# Patient Record
Sex: MALE | Race: WHITE | Employment: FULL TIME | URBAN - METROPOLITAN AREA
[De-identification: names, ages, dates, MRNs, and addresses within clinical notes are randomized per-mention and may not be internally consistent; named-entity substitution may affect disease eponyms.]

---

## 2018-12-06 ENCOUNTER — HOSPITAL ENCOUNTER (OUTPATIENT)
Age: 58
Setting detail: OBSERVATION
Discharge: HOME | End: 2018-12-07
Attending: EMERGENCY MEDICINE | Admitting: INTERNAL MEDICINE
Payer: COMMERCIAL

## 2018-12-06 ENCOUNTER — APPOINTMENT (OUTPATIENT)
Dept: GENERAL RADIOLOGY | Age: 58
End: 2018-12-06
Payer: COMMERCIAL

## 2018-12-06 DIAGNOSIS — R07.9 CHEST PAIN, UNSPECIFIED TYPE: Primary | ICD-10-CM

## 2018-12-06 LAB
A/G RATIO: 1.5 (ref 1.1–2.2)
ALBUMIN SERPL-MCNC: 4.4 G/DL (ref 3.4–5)
ALP BLD-CCNC: 117 U/L (ref 40–129)
ALT SERPL-CCNC: 31 U/L (ref 10–40)
ANION GAP SERPL CALCULATED.3IONS-SCNC: 11 MMOL/L (ref 3–16)
AST SERPL-CCNC: 19 U/L (ref 15–37)
BASOPHILS ABSOLUTE: 0 K/UL (ref 0–0.2)
BASOPHILS RELATIVE PERCENT: 0.5 %
BILIRUB SERPL-MCNC: 0.4 MG/DL (ref 0–1)
BUN BLDV-MCNC: 11 MG/DL (ref 7–20)
CALCIUM SERPL-MCNC: 9 MG/DL (ref 8.3–10.6)
CHLORIDE BLD-SCNC: 97 MMOL/L (ref 99–110)
CO2: 29 MMOL/L (ref 21–32)
CREAT SERPL-MCNC: 0.9 MG/DL (ref 0.9–1.3)
EKG ATRIAL RATE: 66 BPM
EKG DIAGNOSIS: NORMAL
EKG P AXIS: 31 DEGREES
EKG P-R INTERVAL: 134 MS
EKG Q-T INTERVAL: 386 MS
EKG QRS DURATION: 96 MS
EKG QTC CALCULATION (BAZETT): 404 MS
EKG R AXIS: 48 DEGREES
EKG T AXIS: 24 DEGREES
EKG VENTRICULAR RATE: 66 BPM
EOSINOPHILS ABSOLUTE: 0.1 K/UL (ref 0–0.6)
EOSINOPHILS RELATIVE PERCENT: 1.1 %
GFR AFRICAN AMERICAN: >60
GFR NON-AFRICAN AMERICAN: >60
GLOBULIN: 2.9 G/DL
GLUCOSE BLD-MCNC: 185 MG/DL (ref 70–99)
GLUCOSE BLD-MCNC: 225 MG/DL (ref 70–99)
HCT VFR BLD CALC: 49.3 % (ref 40.5–52.5)
HEMOGLOBIN: 17.2 G/DL (ref 13.5–17.5)
LYMPHOCYTES ABSOLUTE: 1.8 K/UL (ref 1–5.1)
LYMPHOCYTES RELATIVE PERCENT: 25.5 %
MCH RBC QN AUTO: 32.1 PG (ref 26–34)
MCHC RBC AUTO-ENTMCNC: 34.9 G/DL (ref 31–36)
MCV RBC AUTO: 92.1 FL (ref 80–100)
MONOCYTES ABSOLUTE: 0.5 K/UL (ref 0–1.3)
MONOCYTES RELATIVE PERCENT: 7.7 %
NEUTROPHILS ABSOLUTE: 4.5 K/UL (ref 1.7–7.7)
NEUTROPHILS RELATIVE PERCENT: 65.2 %
PDW BLD-RTO: 12.6 % (ref 12.4–15.4)
PERFORMED ON: ABNORMAL
PLATELET # BLD: 219 K/UL (ref 135–450)
PMV BLD AUTO: 7.3 FL (ref 5–10.5)
POTASSIUM SERPL-SCNC: 4.2 MMOL/L (ref 3.5–5.1)
RBC # BLD: 5.35 M/UL (ref 4.2–5.9)
SODIUM BLD-SCNC: 137 MMOL/L (ref 136–145)
TOTAL PROTEIN: 7.3 G/DL (ref 6.4–8.2)
TROPONIN: <0.01 NG/ML
WBC # BLD: 6.9 K/UL (ref 4–11)

## 2018-12-06 PROCEDURE — 80053 COMPREHEN METABOLIC PANEL: CPT

## 2018-12-06 PROCEDURE — G0378 HOSPITAL OBSERVATION PER HR: HCPCS

## 2018-12-06 PROCEDURE — 93005 ELECTROCARDIOGRAM TRACING: CPT | Performed by: EMERGENCY MEDICINE

## 2018-12-06 PROCEDURE — 93010 ELECTROCARDIOGRAM REPORT: CPT | Performed by: INTERNAL MEDICINE

## 2018-12-06 PROCEDURE — 36415 COLL VENOUS BLD VENIPUNCTURE: CPT

## 2018-12-06 PROCEDURE — 84484 ASSAY OF TROPONIN QUANT: CPT

## 2018-12-06 PROCEDURE — 2580000003 HC RX 258: Performed by: INTERNAL MEDICINE

## 2018-12-06 PROCEDURE — 99285 EMERGENCY DEPT VISIT HI MDM: CPT

## 2018-12-06 PROCEDURE — 71046 X-RAY EXAM CHEST 2 VIEWS: CPT

## 2018-12-06 PROCEDURE — 85025 COMPLETE CBC W/AUTO DIFF WBC: CPT

## 2018-12-06 PROCEDURE — 83036 HEMOGLOBIN GLYCOSYLATED A1C: CPT

## 2018-12-06 PROCEDURE — 6370000000 HC RX 637 (ALT 250 FOR IP): Performed by: PHYSICIAN ASSISTANT

## 2018-12-06 RX ORDER — DIAZEPAM 2 MG/1
2 TABLET ORAL 3 TIMES DAILY
Status: DISCONTINUED | OUTPATIENT
Start: 2018-12-06 | End: 2018-12-07 | Stop reason: HOSPADM

## 2018-12-06 RX ORDER — SODIUM CHLORIDE 0.9 % (FLUSH) 0.9 %
10 SYRINGE (ML) INJECTION EVERY 12 HOURS SCHEDULED
Status: DISCONTINUED | OUTPATIENT
Start: 2018-12-06 | End: 2018-12-07 | Stop reason: HOSPADM

## 2018-12-06 RX ORDER — ATORVASTATIN CALCIUM 40 MG/1
40 TABLET, FILM COATED ORAL NIGHTLY
Status: DISCONTINUED | OUTPATIENT
Start: 2018-12-06 | End: 2018-12-07 | Stop reason: HOSPADM

## 2018-12-06 RX ORDER — ASPIRIN 81 MG/1
324 TABLET, CHEWABLE ORAL ONCE
Status: COMPLETED | OUTPATIENT
Start: 2018-12-06 | End: 2018-12-06

## 2018-12-06 RX ORDER — ONDANSETRON 2 MG/ML
4 INJECTION INTRAMUSCULAR; INTRAVENOUS EVERY 6 HOURS PRN
Status: DISCONTINUED | OUTPATIENT
Start: 2018-12-06 | End: 2018-12-07 | Stop reason: HOSPADM

## 2018-12-06 RX ORDER — DEXTROSE MONOHYDRATE 25 G/50ML
12.5 INJECTION, SOLUTION INTRAVENOUS PRN
Status: DISCONTINUED | OUTPATIENT
Start: 2018-12-06 | End: 2018-12-07 | Stop reason: HOSPADM

## 2018-12-06 RX ORDER — NITROGLYCERIN 0.4 MG/1
0.4 TABLET SUBLINGUAL EVERY 5 MIN PRN
Status: DISCONTINUED | OUTPATIENT
Start: 2018-12-06 | End: 2018-12-07 | Stop reason: HOSPADM

## 2018-12-06 RX ORDER — SODIUM CHLORIDE 0.9 % (FLUSH) 0.9 %
10 SYRINGE (ML) INJECTION PRN
Status: DISCONTINUED | OUTPATIENT
Start: 2018-12-06 | End: 2018-12-07 | Stop reason: HOSPADM

## 2018-12-06 RX ORDER — ASPIRIN 81 MG/1
81 TABLET, CHEWABLE ORAL DAILY
Status: DISCONTINUED | OUTPATIENT
Start: 2018-12-07 | End: 2018-12-07 | Stop reason: HOSPADM

## 2018-12-06 RX ORDER — FAMOTIDINE 20 MG/1
20 TABLET, FILM COATED ORAL 2 TIMES DAILY
Status: DISCONTINUED | OUTPATIENT
Start: 2018-12-06 | End: 2018-12-07 | Stop reason: HOSPADM

## 2018-12-06 RX ORDER — DEXTROSE MONOHYDRATE 50 MG/ML
100 INJECTION, SOLUTION INTRAVENOUS PRN
Status: DISCONTINUED | OUTPATIENT
Start: 2018-12-06 | End: 2018-12-07 | Stop reason: HOSPADM

## 2018-12-06 RX ORDER — NICOTINE POLACRILEX 4 MG
15 LOZENGE BUCCAL PRN
Status: DISCONTINUED | OUTPATIENT
Start: 2018-12-06 | End: 2018-12-07 | Stop reason: HOSPADM

## 2018-12-06 RX ADMIN — NITROGLYCERIN 0.5 INCH: 20 OINTMENT TOPICAL at 14:15

## 2018-12-06 RX ADMIN — ASPIRIN 81 MG 162 MG: 81 TABLET ORAL at 14:14

## 2018-12-06 RX ADMIN — SODIUM CHLORIDE, PRESERVATIVE FREE 10 ML: 5 INJECTION INTRAVENOUS at 20:37

## 2018-12-06 ASSESSMENT — PAIN DESCRIPTION - PAIN TYPE: TYPE: ACUTE PAIN

## 2018-12-06 ASSESSMENT — PAIN SCALES - GENERAL
PAINLEVEL_OUTOF10: 0
PAINLEVEL_OUTOF10: 0
PAINLEVEL_OUTOF10: 4

## 2018-12-06 ASSESSMENT — PAIN DESCRIPTION - LOCATION: LOCATION: CHEST

## 2018-12-06 ASSESSMENT — PAIN DESCRIPTION - ORIENTATION: ORIENTATION: LEFT

## 2018-12-06 NOTE — ED PROVIDER NOTES
atherosclerosis. The cardiomediastinal and hilar silhouettes appear otherwise unremarkable. The lungs appear clear. No pleural fluid evident. No pneumothorax is seen. No acute osseous abnormality is identified. No acute pulmonary disease. Calcific atherosclerotic disease aorta. HEART SCORE:  History: +1 for moderate suspicion  EKG: +0 for normal EKG   Age: +1 for age 44-72 years  Risk factors (includes HLD, HTN, DM, tobacco use, obesity, and +FHx): +2 for known CAD or 3+ risk factors  Initial troponin: +0 for negative troponin    Heart score: 4. This falls under the following category: Score of 4-6, which indicates low/moderate risk for major adverse cardiac event and supports observation with repeated troponins and/or non-invasive testing    ED COURSE   I have evaluated this patient in collaboration with Dr. Billy Leon. Patient received aspirin and Nitropaste for symptoms although currently asymptomatic, with good relief. I'll triage vitals with elevated blood pressure 160/117. Remainder vital stable. CBC and CMP unremarkable. Troponin less than 0.01. Chest x-ray clear. EKG normal sinus rhythm without acute evidence for acute ischemia. Given the social and family history as well as elevated blood pressure and acute elevated blood glucose feel that patient may have undiagnosed diabetes as well as hypertension and increasing clinical concern for potential cardiac etiology. we are at this time recommending admission. I spoke with hospitalist Dr. Maik Medrano and orders have been placed. A discussion was had with Mr. Valerie Jaramillo regarding chest pain, ED findings, and recommendations for admission. All questions were answered. Patient is in agreement.     MDM  Results for orders placed or performed during the hospital encounter of 12/06/18   CBC Auto Differential   Result Value Ref Range    WBC 6.9 4.0 - 11.0 K/uL    RBC 5.35 4.20 - 5.90 M/uL    Hemoglobin 17.2 13.5 - 17.5 g/dL    Hematocrit 49.3 40.5 - 52.5 % Blood pressure (!) 159/98, pulse 60, temperature 98.6 °F (37 °C), temperature source Oral, resp. rate 18, height 5' 8\" (1.727 m), weight 190 lb (86.2 kg), SpO2 100 %. DISPOSITION  Patient was admitted to the hospital in stable condition.           Abdifatah Gould, [address]  12/06/18 Sanjuana Barry

## 2018-12-06 NOTE — ED PROVIDER NOTES
I independently performed a history and physical on [employer]. All diagnostic, treatment, and disposition decisions were made by myself in conjunction with the advanced practice provider. For further details of [facility] emergency department encounter, please see ANUJ Mosqueda's documentation. Patient presents today was apparently complaining of chest pain. Patient has had increasing frequency of chest pain over the last several days. Pain radiates into the left shoulder. No fevers, chills, sweats. No external swelling or edema. Physical exam: Gen.: No distress. Heart: Regular rhythm. Normal S1-S2. Lungs: Clear to auscultation bilaterally. Wheeze, rales or rhonchi. Heart: Soft. External: No swelling or edema. Undiagnosed DM and hypertension.      1. Chest pain, unspecified type           August Orr, DO  12/06/18 [address] [place] [address], DO  12/06/18 [id number]

## 2018-12-06 NOTE — H&P
Hospital Medicine History & Physical      PCP: No primary care provider on file. Date of Admission: 12/6/2018    Date of Service: Pt seen/examined on 6 Dec and  Placed in Observation. Chief Complaint:  Chest pain       History Of Present Illness:        60 y.o. male w/out prior hx on no home meds but w/ significant family hx of CAD who presented to [facility] with acute onset moderately severe non-exertional substernal CP w/ radiation to LUE but w/out associated typical cardiac features. The patient denies any fever/chills or other signs/sxs of systemic illness or identifiable aggravating/alleviating factors. Family reports significant Etoh use. Past Medical History:      History reviewed. No pertinent past medical history. Past Surgical History:          Procedure Laterality Date    LEG SURGERY Left     kahlil in leg       Medications Prior to Admission:      Prior to Admission medications    Not on File       Allergies:  Patient has no known allergies. Social History:      The patient currently lives independently    TOBACCO:   reports that he has been smoking Cigarettes. He has been smoking about 1.00 pack per day. His smokeless tobacco use includes Chew. ETOH:   reports that he drinks alcohol. Family History:      Reviewed in detail and negative for DM, CAD, Cancer, CVA. Positive as follows:    History reviewed. No pertinent family history. REVIEW OF SYSTEMS:   Pertinent positives as noted in the HPI. All other systems reviewed and negative. PHYSICAL EXAM:    BP (!) 162/93   Pulse 60   Temp 98.4 °F (36.9 °C) (Oral)   Resp 16   Ht 5' 8\" (1.727 m)   Wt 190 lb (86.2 kg)   SpO2 98%   BMI 28.89 kg/m²     General appearance:  No apparent distress, appears stated age and cooperative. HEENT:  Normal cephalic, atraumatic without obvious deformity. Pupils equal, round, and reactive to light. Extra ocular muscles intact. Conjunctivae/corneas clear.   Neck: Supple, with

## 2018-12-07 ENCOUNTER — APPOINTMENT (OUTPATIENT)
Dept: NUCLEAR MEDICINE | Age: 58
End: 2018-12-07
Payer: COMMERCIAL

## 2018-12-07 VITALS
SYSTOLIC BLOOD PRESSURE: 149 MMHG | WEIGHT: 180.8 LBS | RESPIRATION RATE: 16 BRPM | TEMPERATURE: 98.3 F | BODY MASS INDEX: 27.4 KG/M2 | HEART RATE: 59 BPM | HEIGHT: 68 IN | OXYGEN SATURATION: 96 % | DIASTOLIC BLOOD PRESSURE: 80 MMHG

## 2018-12-07 LAB
ALBUMIN SERPL-MCNC: 3.8 G/DL (ref 3.4–5)
ANION GAP SERPL CALCULATED.3IONS-SCNC: 9 MMOL/L (ref 3–16)
BUN BLDV-MCNC: 13 MG/DL (ref 7–20)
CALCIUM SERPL-MCNC: 8.7 MG/DL (ref 8.3–10.6)
CHLORIDE BLD-SCNC: 100 MMOL/L (ref 99–110)
CHOLESTEROL, TOTAL: 182 MG/DL (ref 0–199)
CO2: 29 MMOL/L (ref 21–32)
CREAT SERPL-MCNC: 0.9 MG/DL (ref 0.9–1.3)
EKG ATRIAL RATE: 57 BPM
EKG DIAGNOSIS: NORMAL
EKG P AXIS: 30 DEGREES
EKG P-R INTERVAL: 138 MS
EKG Q-T INTERVAL: 420 MS
EKG QRS DURATION: 90 MS
EKG QTC CALCULATION (BAZETT): 408 MS
EKG R AXIS: 57 DEGREES
EKG T AXIS: 52 DEGREES
EKG VENTRICULAR RATE: 57 BPM
ESTIMATED AVERAGE GLUCOSE: 119.8 MG/DL
GFR AFRICAN AMERICAN: >60
GFR NON-AFRICAN AMERICAN: >60
GLUCOSE BLD-MCNC: 117 MG/DL (ref 70–99)
GLUCOSE BLD-MCNC: 122 MG/DL (ref 70–99)
GLUCOSE BLD-MCNC: 138 MG/DL (ref 70–99)
HBA1C MFR BLD: 5.8 %
HCT VFR BLD CALC: 43.7 % (ref 40.5–52.5)
HDLC SERPL-MCNC: 29 MG/DL (ref 40–60)
HEMOGLOBIN: 15.5 G/DL (ref 13.5–17.5)
LDL CHOLESTEROL CALCULATED: 99 MG/DL
LV EF: 60 %
LVEF MODALITY: NORMAL
MCH RBC QN AUTO: 32.3 PG (ref 26–34)
MCHC RBC AUTO-ENTMCNC: 35.4 G/DL (ref 31–36)
MCV RBC AUTO: 91.3 FL (ref 80–100)
PDW BLD-RTO: 12.3 % (ref 12.4–15.4)
PERFORMED ON: ABNORMAL
PERFORMED ON: ABNORMAL
PHOSPHORUS: 3.8 MG/DL (ref 2.5–4.9)
PLATELET # BLD: 193 K/UL (ref 135–450)
PMV BLD AUTO: 7.3 FL (ref 5–10.5)
POTASSIUM SERPL-SCNC: 4.2 MMOL/L (ref 3.5–5.1)
RBC # BLD: 4.78 M/UL (ref 4.2–5.9)
SODIUM BLD-SCNC: 138 MMOL/L (ref 136–145)
TRIGL SERPL-MCNC: 270 MG/DL (ref 0–150)
VLDLC SERPL CALC-MCNC: 54 MG/DL
WBC # BLD: 7.2 K/UL (ref 4–11)

## 2018-12-07 PROCEDURE — 78452 HT MUSCLE IMAGE SPECT MULT: CPT

## 2018-12-07 PROCEDURE — 93017 CV STRESS TEST TRACING ONLY: CPT

## 2018-12-07 PROCEDURE — 6370000000 HC RX 637 (ALT 250 FOR IP): Performed by: INTERNAL MEDICINE

## 2018-12-07 PROCEDURE — G0378 HOSPITAL OBSERVATION PER HR: HCPCS

## 2018-12-07 PROCEDURE — A9502 TC99M TETROFOSMIN: HCPCS | Performed by: INTERNAL MEDICINE

## 2018-12-07 PROCEDURE — 36415 COLL VENOUS BLD VENIPUNCTURE: CPT

## 2018-12-07 PROCEDURE — 85027 COMPLETE CBC AUTOMATED: CPT

## 2018-12-07 PROCEDURE — 93010 ELECTROCARDIOGRAM REPORT: CPT | Performed by: INTERNAL MEDICINE

## 2018-12-07 PROCEDURE — 93005 ELECTROCARDIOGRAM TRACING: CPT | Performed by: INTERNAL MEDICINE

## 2018-12-07 PROCEDURE — 80061 LIPID PANEL: CPT

## 2018-12-07 PROCEDURE — 80069 RENAL FUNCTION PANEL: CPT

## 2018-12-07 PROCEDURE — 3430000000 HC RX DIAGNOSTIC RADIOPHARMACEUTICAL: Performed by: INTERNAL MEDICINE

## 2018-12-07 PROCEDURE — 6360000002 HC RX W HCPCS: Performed by: INTERNAL MEDICINE

## 2018-12-07 RX ORDER — ASPIRIN 81 MG/1
81 TABLET, CHEWABLE ORAL DAILY
Qty: 30 TABLET | Refills: 0 | Status: SHIPPED | OUTPATIENT
Start: 2018-12-08 | End: 2022-08-11

## 2018-12-07 RX ORDER — SIMVASTATIN 20 MG
20 TABLET ORAL EVERY EVENING
Qty: 30 TABLET | Refills: 0 | Status: SHIPPED | OUTPATIENT
Start: 2018-12-07 | End: 2018-12-17 | Stop reason: SDUPTHER

## 2018-12-07 RX ORDER — AMLODIPINE BESYLATE 5 MG/1
5 TABLET ORAL DAILY
Qty: 30 TABLET | Refills: 0 | Status: SHIPPED | OUTPATIENT
Start: 2018-12-07 | End: 2018-12-17 | Stop reason: SDUPTHER

## 2018-12-07 RX ORDER — ATORVASTATIN CALCIUM 40 MG/1
40 TABLET, FILM COATED ORAL NIGHTLY
Qty: 30 TABLET | Refills: 0 | Status: SHIPPED | OUTPATIENT
Start: 2018-12-07 | End: 2018-12-07 | Stop reason: HOSPADM

## 2018-12-07 RX ADMIN — TETROFOSMIN 28 MILLICURIE: 0.23 INJECTION, POWDER, LYOPHILIZED, FOR SOLUTION INTRAVENOUS at 09:25

## 2018-12-07 RX ADMIN — DIAZEPAM 2 MG: 2 TABLET ORAL at 00:29

## 2018-12-07 RX ADMIN — REGADENOSON 0.4 MG: 0.08 INJECTION, SOLUTION INTRAVENOUS at 09:25

## 2018-12-07 RX ADMIN — TETROFOSMIN 11.9 MILLICURIE: 0.23 INJECTION, POWDER, LYOPHILIZED, FOR SOLUTION INTRAVENOUS at 08:15

## 2018-12-07 NOTE — PROGRESS NOTES
Hospitalist Progress Note      PCP: No primary care provider on file. Date of Admission: 12/6/2018    Chief Complaint: cp     Hospital Course:     Subjective:    Feels well today, no chest pain, sob. No new complaints. Stress results pending       Medications:  Reviewed    Infusion Medications    dextrose       Scheduled Medications    sodium chloride flush  10 mL Intravenous 2 times per day    famotidine  20 mg Oral BID    atorvastatin  40 mg Oral Nightly    aspirin  81 mg Oral Daily    enoxaparin  40 mg Subcutaneous Daily    insulin lispro  0-6 Units Subcutaneous TID WC    insulin lispro  0-3 Units Subcutaneous Nightly    diazepam  2 mg Oral TID    sodium chloride flush  10 mL Intravenous 2 times per day     PRN Meds: sodium chloride flush, magnesium hydroxide, ondansetron, nitroGLYCERIN, glucose, dextrose, glucagon (rDNA), dextrose, sodium chloride flush      Intake/Output Summary (Last 24 hours) at 12/07/18 1303  Last data filed at 12/07/18 0600   Gross per 24 hour   Intake                0 ml   Output                0 ml   Net                0 ml       Physical Exam Performed:    BP (!) 149/80   Pulse 59   Temp 98.3 °F (36.8 °C) (Oral)   Resp 16   Ht 5' 8\" (1.727 m)   Wt 180 lb 12.8 oz (82 kg)   SpO2 96%   BMI 27.49 kg/m²     General appearance: No apparent distress, appears stated age and cooperative. HEENT: Pupils equal, round, and reactive to light. Conjunctivae/corneas clear. Neck: Supple, with full range of motion. No jugular venous distention. Respiratory:  Normal respiratory effort. Clear to auscultation, bilaterally without Rales/Wheezes/Rhonchi. Cardiovascular: Regular rate and rhythm with normal S1/S2 without murmurs, rubs or gallops. Abdomen: Soft, non-tender, non-distended with normal bowel sounds. Musculoskeletal: No clubbing, cyanosis or edema bilaterally. Skin: Skin color, texture, turgor normal.  No rashes or lesions.   Neurologic:  Neurovascularly intact without any focal sensory/motor deficits. Cranial nerves: II-XII intact, grossly non-focal.  Psychiatric: Alert and oriented, thought content appropriate, normal insight  Capillary Refill: Brisk,< 3 seconds   Peripheral Pulses: +2 palpable, equal bilaterally       Labs:   Recent Labs      12/06/18   1355  12/07/18   0717   WBC  6.9  7.2   HGB  17.2  15.5   HCT  49.3  43.7   PLT  219  193     Recent Labs      12/06/18   1355  12/07/18   0717   NA  137  138   K  4.2  4.2   CL  97*  100   CO2  29  29   BUN  11  13   CREATININE  0.9  0.9   CALCIUM  9.0  8.7   PHOS   --   3.8     Recent Labs      12/06/18   1355   AST  19   ALT  31   BILITOT  0.4   ALKPHOS  117     No results for input(s): INR in the last 72 hours. Recent Labs      12/06/18   1355  12/06/18   1912  12/06/18   2219   TROPONINI  <0.01  <0.01  <0.01       Urinalysis:    No results found for: Charolotte Cleopatra, BACTERIA, RBCUA, BLOODU, SPECGRAV, GLUCOSEU    Radiology:  XR CHEST STANDARD (2 VW)   Final Result   No acute pulmonary disease. Calcific atherosclerotic disease aorta. NM Cardiac Stress Test Nuclear Imaging    (Results Pending)           Assessment/Plan:    Active Hospital Problems    Diagnosis Date Noted    Chest pain [R07.9] 12/06/2018       Chest pain - concerning for ACS in the ED. EKG wnl, troponin negative. Stress test pending. Possible cervical disease, possibly related to underlying HTN   - follow up stress  - ASA, statin  - management of 2ndary risk factors     Hyperglycemia - a1c pending. Suspect has underlying DM2 that will require medication management and education. Needs a PCP     HTN - uncontrolled, possibly some anxiety component while in the hospital however likely has uncontrolled essential HTN  - likely start amlodipine on d/c.      Tobacco abuse - counseled on cessation, nicotine patch PRN while inpatient offered        DVT Prophylaxis: lovenox   Diet: Diet NPO, After Midnight  Code Status: Full Code    PT/OT Eval Status: not

## 2018-12-07 NOTE — DISCHARGE SUMMARY
Hospital Medicine Discharge Summary    Patient ID: Axel Roman      Patient's PCP: No primary care provider on file. Admit Date: 12/6/2018     Discharge Date:   12/7/18    Admitting Physician: Hyun Langston MD     Discharge Physician: Enrique Saldaña MD     Discharge Diagnoses: Active Hospital Problems    Diagnosis Date Noted    Chest pain [R07.9] 12/06/2018       The patient was seen and examined on day of discharge and this discharge summary is in conjunction with any daily progress note from day of discharge. Hospital Course:     Chest pain - concerning for ACS on admission. Negative EKG, troponin negative. Stress test negative for reversible ischemia. Counseled on primary prevention issues as below. Needs to establish with PCP for age appropriate interventions. HTN - uncontrolled, started on amlodipine. Given information to establish with PCP and [facility]     HLD - started on statin     Hyperglycemia - suspect isolated 2/2 stress. A1c < 6. Continue with dietary changes     ETOH use - counseled on cessation     Tobacco abuse - counseled on cessation     Physical Exam Performed:     BP (!) 149/80   Pulse 59   Temp 98.3 °F (36.8 °C) (Oral)   Resp 16   Ht 5' 8\" (1.727 m)   Wt 180 lb 12.8 oz (82 kg)   SpO2 96%   BMI 27.49 kg/m²       General appearance:  No apparent distress, appears stated age and cooperative. HEENT:  Normal cephalic, atraumatic without obvious deformity. Pupils equal, round, and reactive to light. Neck: Supple, with full range of motion. No jugular venous distention. Trachea midline. Respiratory:  Normal respiratory effort. Clear to auscultation, bilaterally without Rales/Wheezes/Rhonchi. Cardiovascular:  Regular rate and rhythm with normal S1/S2 without murmurs, rubs or gallops. Abdomen: Soft, non-tender, non-distended with normal bowel sounds. Musculoskeletal:  No clubbing, cyanosis or edema bilaterally.   Full range of motion without

## 2018-12-16 PROBLEM — I10 ESSENTIAL HYPERTENSION: Status: ACTIVE | Noted: 2018-12-16

## 2018-12-16 PROBLEM — Z00.00 HEALTHCARE MAINTENANCE: Status: ACTIVE | Noted: 2018-12-16

## 2018-12-16 PROBLEM — R73.03 PREDIABETES: Status: ACTIVE | Noted: 2018-12-16

## 2018-12-16 ASSESSMENT — ENCOUNTER SYMPTOMS
SORE THROAT: 0
ABDOMINAL PAIN: 0
NAUSEA: 0
SHORTNESS OF BREATH: 0
BLOOD IN STOOL: 0
TROUBLE SWALLOWING: 0
DIARRHEA: 0
COUGH: 0
CONSTIPATION: 0
COLOR CHANGE: 0
CHEST TIGHTNESS: 0
RHINORRHEA: 0
VOMITING: 0

## 2018-12-17 ENCOUNTER — OFFICE VISIT (OUTPATIENT)
Dept: FAMILY MEDICINE CLINIC | Age: 58
End: 2018-12-17
Payer: COMMERCIAL

## 2018-12-17 VITALS
BODY MASS INDEX: 28.34 KG/M2 | WEIGHT: 186.4 LBS | OXYGEN SATURATION: 96 % | HEART RATE: 58 BPM | SYSTOLIC BLOOD PRESSURE: 150 MMHG | DIASTOLIC BLOOD PRESSURE: 90 MMHG

## 2018-12-17 DIAGNOSIS — E78.2 MIXED HYPERLIPIDEMIA: ICD-10-CM

## 2018-12-17 DIAGNOSIS — Z23 NEED FOR INFLUENZA VACCINATION: ICD-10-CM

## 2018-12-17 DIAGNOSIS — R73.03 PREDIABETES: ICD-10-CM

## 2018-12-17 DIAGNOSIS — R07.89 OTHER CHEST PAIN: ICD-10-CM

## 2018-12-17 DIAGNOSIS — I10 ESSENTIAL HYPERTENSION: ICD-10-CM

## 2018-12-17 DIAGNOSIS — Z76.89 ENCOUNTER TO ESTABLISH CARE: Primary | ICD-10-CM

## 2018-12-17 DIAGNOSIS — Z00.00 HEALTHCARE MAINTENANCE: ICD-10-CM

## 2018-12-17 DIAGNOSIS — Z72.0 TOBACCO USE: ICD-10-CM

## 2018-12-17 PROCEDURE — 99386 PREV VISIT NEW AGE 40-64: CPT | Performed by: FAMILY MEDICINE

## 2018-12-17 PROCEDURE — 99203 OFFICE O/P NEW LOW 30 MIN: CPT | Performed by: FAMILY MEDICINE

## 2018-12-17 PROCEDURE — 90715 TDAP VACCINE 7 YRS/> IM: CPT | Performed by: FAMILY MEDICINE

## 2018-12-17 PROCEDURE — 90732 PPSV23 VACC 2 YRS+ SUBQ/IM: CPT | Performed by: FAMILY MEDICINE

## 2018-12-17 PROCEDURE — 90472 IMMUNIZATION ADMIN EACH ADD: CPT | Performed by: FAMILY MEDICINE

## 2018-12-17 PROCEDURE — 90686 IIV4 VACC NO PRSV 0.5 ML IM: CPT | Performed by: FAMILY MEDICINE

## 2018-12-17 PROCEDURE — 90471 IMMUNIZATION ADMIN: CPT | Performed by: FAMILY MEDICINE

## 2018-12-17 RX ORDER — SIMVASTATIN 20 MG
20 TABLET ORAL EVERY EVENING
Qty: 90 TABLET | Refills: 1 | Status: SHIPPED | OUTPATIENT
Start: 2018-12-17 | End: 2019-07-21 | Stop reason: SDUPTHER

## 2018-12-17 RX ORDER — BUPROPION HYDROCHLORIDE 300 MG/1
300 TABLET ORAL EVERY MORNING
Qty: 30 TABLET | Refills: 1 | Status: SHIPPED | OUTPATIENT
Start: 2018-12-17 | End: 2019-01-21 | Stop reason: SDUPTHER

## 2018-12-17 RX ORDER — BUPROPION HYDROCHLORIDE 150 MG/1
150 TABLET ORAL EVERY MORNING
Qty: 7 TABLET | Refills: 0 | Status: SHIPPED | OUTPATIENT
Start: 2018-12-17 | End: 2019-01-21 | Stop reason: ALTCHOICE

## 2018-12-17 RX ORDER — AMLODIPINE BESYLATE 10 MG/1
5 TABLET ORAL DAILY
Qty: 90 TABLET | Refills: 3 | Status: SHIPPED | OUTPATIENT
Start: 2018-12-17 | End: 2018-12-18 | Stop reason: SDUPTHER

## 2018-12-17 NOTE — PROGRESS NOTES
Sobia Mart  YOB: 1960    Date of Service:  12/17/2018    Chief Complaint:   Sobia Mart is a 60 y.o. male who presents for complete physical examination. HPI:    Works nights,  at [employer]     Smoking: wants to quit     HTN: doesn't monitor at home  No CP, new Sxs today    Self-testicular exams: Yes, no concerns  Sexual activity: has sex with female, wife    Abnormal Sxs: none    Previous Colonoscopy no  BRBR, unexplained WL, bloating, abd pain   Yes, BRBR from hemorrhoids, last bleeding couple days ago  Family Hx of Colon Ca  yes - brother and mother, 47 for both     Dental Exams: \"It's been a while. \" H/o root canal     Exercise: walks 5 time(s) per week  Diet: eats a lot of fast food    Seatbelt use: Yes  Guns in the home: no  Sunscreen: No    Wt Readings from Last 3 Encounters:   12/17/18 186 lb 6.4 oz (84.6 kg)   12/07/18 180 lb 12.8 oz (82 kg)     BP Readings from Last 3 Encounters:   12/17/18 (!) 150/90   12/07/18 (!) 149/80       Patient Active Problem List   Diagnosis    Chest pain    Healthcare maintenance    Prediabetes    Essential hypertension       Health Maintenance   Topic Date Due    Hepatitis C screen  1960    HIV screen  11/16/1974    DTaP/Tdap/Td vaccine (1 - Tdap) 11/16/1978    Pneumococcal med risk (1 of 1 - PPSV23) 11/16/1978    Shingles Vaccine (1 of 2 - 2 Dose Series) 11/16/2009    Colon cancer screen colonoscopy  11/16/2009    Flu vaccine (1) 09/01/2018    A1C test (Diabetic or Prediabetic)  12/06/2019    Potassium monitoring  12/07/2019    Creatinine monitoring  12/07/2019    Lipid screen  12/07/2023       There is no immunization history for the selected administration types on file for this patient.     No Known Allergies  Current Outpatient Prescriptions   Medication Sig Dispense Refill    amLODIPine (NORVASC) 10 MG tablet Take 0.5 tablets by mouth daily 90 tablet 3    simvastatin (ZOCOR) 20 MG tablet Take 1 tablet by Dispense: 90 tablet; Refill: 1    8. Need for influenza vaccination  - INFLUENZA, QUADV, 3 YRS AND OLDER, IM, PF, PREFILL SYR OR SDV, 0.5ML (FLUZONE QUADV, PF)      Return in about 5 weeks (around 1/21/2019) for follow up medication changes.    BP check and f/u Wellbutrin influence

## 2018-12-18 ENCOUNTER — TELEPHONE (OUTPATIENT)
Dept: FAMILY MEDICINE CLINIC | Age: 58
End: 2018-12-18

## 2018-12-18 DIAGNOSIS — I10 ESSENTIAL HYPERTENSION: ICD-10-CM

## 2018-12-18 RX ORDER — AMLODIPINE BESYLATE 10 MG/1
10 TABLET ORAL DAILY
Qty: 90 TABLET | Refills: 3 | Status: SHIPPED | OUTPATIENT
Start: 2018-12-18 | End: 2019-12-30

## 2018-12-19 ENCOUNTER — TELEPHONE (OUTPATIENT)
Dept: FAMILY MEDICINE CLINIC | Age: 58
End: 2018-12-19

## 2018-12-26 NOTE — ED NOTES
Cysto (RT) URS, HLL, (RT) stent placement @ [facility] 1/11/19 9:30am   PAT same day           Left voicemail, procedure info emailed 12/26/18. Tele [phone] @ [phone]

## 2019-01-15 PROBLEM — Z00.00 HEALTHCARE MAINTENANCE: Status: RESOLVED | Noted: 2018-12-16 | Resolved: 2019-01-15

## 2019-01-21 ENCOUNTER — OFFICE VISIT (OUTPATIENT)
Dept: FAMILY MEDICINE CLINIC | Age: 59
End: 2019-01-21
Payer: COMMERCIAL

## 2019-01-21 VITALS
OXYGEN SATURATION: 98 % | DIASTOLIC BLOOD PRESSURE: 80 MMHG | SYSTOLIC BLOOD PRESSURE: 140 MMHG | WEIGHT: 189 LBS | BODY MASS INDEX: 28.74 KG/M2 | HEART RATE: 64 BPM

## 2019-01-21 DIAGNOSIS — I10 ESSENTIAL HYPERTENSION: Primary | ICD-10-CM

## 2019-01-21 DIAGNOSIS — Z72.0 TOBACCO USE: ICD-10-CM

## 2019-01-21 PROCEDURE — 99406 BEHAV CHNG SMOKING 3-10 MIN: CPT | Performed by: FAMILY MEDICINE

## 2019-01-21 PROCEDURE — 99214 OFFICE O/P EST MOD 30 MIN: CPT | Performed by: FAMILY MEDICINE

## 2019-01-21 RX ORDER — LISINOPRIL 10 MG/1
10 TABLET ORAL DAILY
Qty: 30 TABLET | Refills: 0 | Status: SHIPPED | OUTPATIENT
Start: 2019-01-21 | End: 2019-02-12 | Stop reason: SDUPTHER

## 2019-01-21 RX ORDER — BUPROPION HYDROCHLORIDE 300 MG/1
300 TABLET ORAL EVERY MORNING
Qty: 90 TABLET | Refills: 1 | Status: SHIPPED | OUTPATIENT
Start: 2019-01-21 | End: 2019-02-22 | Stop reason: SDUPTHER

## 2019-01-21 ASSESSMENT — ENCOUNTER SYMPTOMS
SHORTNESS OF BREATH: 0
NAUSEA: 0
ABDOMINAL PAIN: 0
CHEST TIGHTNESS: 0

## 2019-02-12 DIAGNOSIS — I10 ESSENTIAL HYPERTENSION: ICD-10-CM

## 2019-02-13 RX ORDER — LISINOPRIL 10 MG/1
TABLET ORAL
Qty: 30 TABLET | Refills: 0 | Status: SHIPPED | OUTPATIENT
Start: 2019-02-13 | End: 2019-02-22 | Stop reason: SINTOL

## 2019-02-22 ENCOUNTER — OFFICE VISIT (OUTPATIENT)
Dept: FAMILY MEDICINE CLINIC | Age: 59
End: 2019-02-22
Payer: COMMERCIAL

## 2019-02-22 VITALS
WEIGHT: 183 LBS | SYSTOLIC BLOOD PRESSURE: 138 MMHG | HEART RATE: 65 BPM | BODY MASS INDEX: 27.83 KG/M2 | DIASTOLIC BLOOD PRESSURE: 70 MMHG | OXYGEN SATURATION: 99 %

## 2019-02-22 DIAGNOSIS — I10 ESSENTIAL HYPERTENSION: Primary | ICD-10-CM

## 2019-02-22 DIAGNOSIS — Z72.0 TOBACCO USE: ICD-10-CM

## 2019-02-22 PROCEDURE — 99214 OFFICE O/P EST MOD 30 MIN: CPT | Performed by: FAMILY MEDICINE

## 2019-02-22 RX ORDER — BUPROPION HYDROCHLORIDE 150 MG/1
150 TABLET ORAL EVERY MORNING
Qty: 30 TABLET | Refills: 1 | Status: SHIPPED | OUTPATIENT
Start: 2019-02-22 | End: 2019-03-22 | Stop reason: SINTOL

## 2019-02-22 ASSESSMENT — ENCOUNTER SYMPTOMS
SHORTNESS OF BREATH: 0
COUGH: 0
NAUSEA: 0
CHEST TIGHTNESS: 0
ABDOMINAL PAIN: 0
VOMITING: 0

## 2019-03-21 DIAGNOSIS — Z72.0 TOBACCO USE: ICD-10-CM

## 2019-03-22 ENCOUNTER — OFFICE VISIT (OUTPATIENT)
Dept: FAMILY MEDICINE CLINIC | Age: 59
End: 2019-03-22
Payer: COMMERCIAL

## 2019-03-22 VITALS
BODY MASS INDEX: 28.13 KG/M2 | OXYGEN SATURATION: 99 % | HEART RATE: 62 BPM | WEIGHT: 185 LBS | DIASTOLIC BLOOD PRESSURE: 80 MMHG | SYSTOLIC BLOOD PRESSURE: 130 MMHG

## 2019-03-22 DIAGNOSIS — I10 ESSENTIAL HYPERTENSION: Primary | ICD-10-CM

## 2019-03-22 PROCEDURE — 99213 OFFICE O/P EST LOW 20 MIN: CPT | Performed by: FAMILY MEDICINE

## 2019-03-22 RX ORDER — BUPROPION HYDROCHLORIDE 150 MG/1
150 TABLET ORAL EVERY MORNING
Qty: 90 TABLET | Refills: 1 | OUTPATIENT
Start: 2019-03-22

## 2019-03-22 ASSESSMENT — PATIENT HEALTH QUESTIONNAIRE - PHQ9
2. FEELING DOWN, DEPRESSED OR HOPELESS: 0
SUM OF ALL RESPONSES TO PHQ9 QUESTIONS 1 & 2: 0
SUM OF ALL RESPONSES TO PHQ QUESTIONS 1-9: 0
1. LITTLE INTEREST OR PLEASURE IN DOING THINGS: 0
SUM OF ALL RESPONSES TO PHQ QUESTIONS 1-9: 0

## 2019-03-22 ASSESSMENT — ENCOUNTER SYMPTOMS
CHEST TIGHTNESS: 0
NAUSEA: 0
SHORTNESS OF BREATH: 0
ABDOMINAL PAIN: 0
VOMITING: 0
COUGH: 0

## 2019-05-20 DIAGNOSIS — I10 ESSENTIAL HYPERTENSION: ICD-10-CM

## 2019-05-20 RX ORDER — LISINOPRIL 10 MG/1
TABLET ORAL
Qty: 30 TABLET | Refills: 0 | OUTPATIENT
Start: 2019-05-20

## 2019-05-20 NOTE — TELEPHONE ENCOUNTER
Requested Prescriptions     Pending Prescriptions Disp Refills    lisinopril (PRINIVIL;ZESTRIL) 10 MG tablet [Pharmacy Med Name: Lisinopril Oral Tablet 10 MG] 30 tablet 0     Sig: TAKE 1 TABLET BY MOUTH ONE TIME A DAY     Last seen: 3/22/19  Last Filled: Discontinued Lisinopril because it made him feel spaced out on 2/22/19. Upcoming Appointment: No future appointment scheduled at this time.

## 2019-12-29 DIAGNOSIS — I10 ESSENTIAL HYPERTENSION: ICD-10-CM

## 2019-12-30 RX ORDER — AMLODIPINE BESYLATE 10 MG/1
TABLET ORAL
Qty: 90 TABLET | Refills: 1 | Status: SHIPPED | OUTPATIENT
Start: 2019-12-30 | End: 2020-03-31 | Stop reason: SDUPTHER

## 2020-01-28 RX ORDER — SIMVASTATIN 20 MG
TABLET ORAL
Qty: 90 TABLET | Refills: 1 | Status: SHIPPED | OUTPATIENT
Start: 2020-01-28 | End: 2020-03-31 | Stop reason: SDUPTHER

## 2020-04-01 ENCOUNTER — TELEPHONE (OUTPATIENT)
Dept: FAMILY MEDICINE CLINIC | Age: 60
End: 2020-04-01

## 2020-04-01 RX ORDER — AMLODIPINE BESYLATE 10 MG/1
TABLET ORAL
Qty: 30 TABLET | Refills: 0 | Status: SHIPPED | OUTPATIENT
Start: 2020-04-01 | End: 2020-04-02

## 2020-04-01 RX ORDER — SIMVASTATIN 20 MG
TABLET ORAL
Qty: 30 TABLET | Refills: 0 | Status: SHIPPED | OUTPATIENT
Start: 2020-04-01 | End: 2020-09-30 | Stop reason: SDUPTHER

## 2020-04-01 NOTE — TELEPHONE ENCOUNTER
Hasn't been seen in 1 year. Needs VV asap. Will refill medications for 1 month. Please schedule.  Thanks

## 2020-04-02 ENCOUNTER — VIRTUAL VISIT (OUTPATIENT)
Dept: FAMILY MEDICINE CLINIC | Age: 60
End: 2020-04-02
Payer: COMMERCIAL

## 2020-04-02 PROBLEM — R07.9 CHEST PAIN: Status: RESOLVED | Noted: 2018-12-06 | Resolved: 2020-04-02

## 2020-04-02 PROBLEM — E78.2 MIXED HYPERLIPIDEMIA: Status: ACTIVE | Noted: 2020-04-02

## 2020-04-02 PROCEDURE — 99214 OFFICE O/P EST MOD 30 MIN: CPT | Performed by: FAMILY MEDICINE

## 2020-04-02 RX ORDER — AMLODIPINE BESYLATE 10 MG/1
10 TABLET ORAL DAILY
Qty: 90 TABLET | Refills: 1 | Status: SHIPPED | OUTPATIENT
Start: 2020-04-02 | End: 2020-06-25 | Stop reason: SDUPTHER

## 2020-04-02 ASSESSMENT — ENCOUNTER SYMPTOMS
VOMITING: 0
ABDOMINAL PAIN: 0
NAUSEA: 0
SHORTNESS OF BREATH: 0
COUGH: 0
CHEST TIGHTNESS: 0

## 2020-04-02 NOTE — PROGRESS NOTES
2020    TELEHEALTH EVALUATION -- Audio/Visual (During TTZCI-36 public health emergency)    HPI:    Nichole Halsted (:  1960) has requested an audio/video evaluation for the following concern(s):    Hasn't been seen in 1 yr  HTN:  Taking Norvasc daily  Checking Bps at home: today 160/81  130-140s usually, but checks once a month   No new AEs to the medication  No acute concerning Sxs: No CP, SOB, palpitations, dizziness, HA, visual changes, diaphoresis, nausea, etc.   Not working on watching the salt in his diet, loves salt in food  Works in maintenance so walks a lot daily     HLD:   Taking statin nightly  No new AEs    Review of Systems   Constitutional: Negative for activity change, fatigue and fever. HENT: Negative for congestion and tinnitus. Eyes: Negative for visual disturbance. Respiratory: Negative for cough, chest tightness and shortness of breath. Cardiovascular: Negative for chest pain, palpitations and leg swelling. Gastrointestinal: Negative for abdominal pain, nausea and vomiting. Genitourinary: Negative for decreased urine volume and difficulty urinating. Skin: Negative for rash. Neurological: Negative for dizziness, weakness, light-headedness, numbness and headaches. Psychiatric/Behavioral: Negative for sleep disturbance. The patient is not nervous/anxious. Prior to Visit Medications    Medication Sig Taking? Authorizing Provider   amLODIPine (NORVASC) 10 MG tablet Take 1 tablet by mouth daily Yes Darral Kussmaul, MD   simvastatin (ZOCOR) 20 MG tablet TAKE 1 TABLET BY MOUTH IN THE EVENING  Darral Kussmaul, MD   aspirin 81 MG chewable tablet Take 1 tablet by mouth daily  Beata Bucio MD       Social History     Tobacco Use    Smoking status: Current Every Day Smoker     Packs/day: 1.00     Types: Cigarettes    Smokeless tobacco: Current User     Types: Chew   Substance Use Topics    Alcohol use: Yes     Comment: 6-8 beers per day    Drug use:  No breathing or respiratory distress        [] Abnormal-      Musculoskeletal:   [] Normal gait with no signs of ataxia         [x] Normal range of motion of neck        [] Abnormal-       Neurological:        [x] No Facial Asymmetry (Cranial nerve 7 motor function) (limited exam to video visit)          [] No gaze palsy        [] Abnormal-         Skin:        [x] No significant exanthematous lesions or discoloration noted on facial skin         [] Abnormal-            Psychiatric:       [x] Normal Affect [] No Hallucinations        [] Abnormal-     Other pertinent observable physical exam findings-     ASSESSMENT/PLAN:  1. Essential hypertension  Checks Bps sporadically and they are borderline based on his report  He was instructed to measure Bps 1-2 hours after medication admin and call in 1 week with values  Dash diet importance discussed  Daily moderate cardio encouraged    - amLODIPine (NORVASC) 10 MG tablet; Take 1 tablet by mouth daily  Dispense: 90 tablet; Refill: 1    2. Mixed hyperlipidemia  Taking statin nightly  Needs blood work in 6 months to monitor     Return in about 6 months (around 10/2/2020) for follow up blood pressure, follow up labs, 30 minute visit. Virginia Tesfaye is a 60 y.o. male being evaluated by a Virtual Visit (video visit) encounter to address concerns as mentioned above. A caregiver was present when appropriate. Due to this being a TeleHealth encounter (During KFY-27 public health emergency), evaluation of the following organ systems was limited: Vitals/Constitutional/EENT/Resp/CV/GI//MS/Neuro/Skin/Heme-Lymph-Imm. Pursuant to the emergency declaration under the [address] [place] [address] authority and the [employer] Act, this Virtual Visit was conducted with patient's (and/or legal guardian's) consent, to reduce the patient's risk of exposure to COVID-19 and provide necessary medical care.   The

## 2020-04-10 ENCOUNTER — TELEPHONE (OUTPATIENT)
Dept: FAMILY MEDICINE CLINIC | Age: 60
End: 2020-04-10

## 2020-04-13 RX ORDER — LOSARTAN POTASSIUM 25 MG/1
25 TABLET ORAL DAILY
Qty: 30 TABLET | Refills: 0 | Status: SHIPPED | OUTPATIENT
Start: 2020-04-13 | End: 2020-06-25 | Stop reason: SDUPTHER

## 2020-06-25 ENCOUNTER — TELEPHONE (OUTPATIENT)
Dept: FAMILY MEDICINE CLINIC | Age: 60
End: 2020-06-25

## 2020-06-25 RX ORDER — AMLODIPINE BESYLATE 10 MG/1
10 TABLET ORAL DAILY
Qty: 90 TABLET | Refills: 1 | Status: CANCELLED | OUTPATIENT
Start: 2020-06-25

## 2020-06-25 RX ORDER — AMLODIPINE BESYLATE 10 MG/1
10 TABLET ORAL DAILY
Qty: 30 TABLET | Refills: 0 | Status: SHIPPED | OUTPATIENT
Start: 2020-06-25 | End: 2020-09-28 | Stop reason: SDUPTHER

## 2020-06-25 RX ORDER — LOSARTAN POTASSIUM 25 MG/1
25 TABLET ORAL DAILY
Qty: 30 TABLET | Refills: 0 | Status: CANCELLED | OUTPATIENT
Start: 2020-06-25

## 2020-06-25 RX ORDER — LOSARTAN POTASSIUM 25 MG/1
25 TABLET ORAL DAILY
Qty: 30 TABLET | Refills: 0 | Status: SHIPPED | OUTPATIENT
Start: 2020-06-25 | End: 2020-08-10 | Stop reason: SDUPTHER

## 2020-07-23 ENCOUNTER — OFFICE VISIT (OUTPATIENT)
Dept: FAMILY MEDICINE CLINIC | Age: 60
End: 2020-07-23
Payer: COMMERCIAL

## 2020-07-23 VITALS
DIASTOLIC BLOOD PRESSURE: 70 MMHG | HEART RATE: 58 BPM | HEIGHT: 68 IN | WEIGHT: 187 LBS | TEMPERATURE: 98.1 F | BODY MASS INDEX: 28.34 KG/M2 | OXYGEN SATURATION: 98 % | SYSTOLIC BLOOD PRESSURE: 136 MMHG

## 2020-07-23 DIAGNOSIS — I10 ESSENTIAL HYPERTENSION: ICD-10-CM

## 2020-07-23 DIAGNOSIS — Z00.00 HEALTHCARE MAINTENANCE: ICD-10-CM

## 2020-07-23 LAB
BASOPHILS ABSOLUTE: 0 K/UL (ref 0–0.2)
BASOPHILS RELATIVE PERCENT: 0.8 %
EOSINOPHILS ABSOLUTE: 0.2 K/UL (ref 0–0.6)
EOSINOPHILS RELATIVE PERCENT: 2.9 %
HCT VFR BLD CALC: 41.7 % (ref 40.5–52.5)
HEMOGLOBIN: 14.6 G/DL (ref 13.5–17.5)
LYMPHOCYTES ABSOLUTE: 1.6 K/UL (ref 1–5.1)
LYMPHOCYTES RELATIVE PERCENT: 30.7 %
MCH RBC QN AUTO: 32.8 PG (ref 26–34)
MCHC RBC AUTO-ENTMCNC: 35.1 G/DL (ref 31–36)
MCV RBC AUTO: 93.5 FL (ref 80–100)
MONOCYTES ABSOLUTE: 0.4 K/UL (ref 0–1.3)
MONOCYTES RELATIVE PERCENT: 8.2 %
NEUTROPHILS ABSOLUTE: 3.1 K/UL (ref 1.7–7.7)
NEUTROPHILS RELATIVE PERCENT: 57.4 %
PDW BLD-RTO: 13.1 % (ref 12.4–15.4)
PLATELET # BLD: 193 K/UL (ref 135–450)
PMV BLD AUTO: 7.5 FL (ref 5–10.5)
RBC # BLD: 4.46 M/UL (ref 4.2–5.9)
WBC # BLD: 5.3 K/UL (ref 4–11)

## 2020-07-23 PROCEDURE — 99214 OFFICE O/P EST MOD 30 MIN: CPT | Performed by: FAMILY MEDICINE

## 2020-07-23 ASSESSMENT — ENCOUNTER SYMPTOMS
ABDOMINAL PAIN: 0
VOMITING: 0
SHORTNESS OF BREATH: 0
CHEST TIGHTNESS: 0
NAUSEA: 0
COUGH: 0

## 2020-07-23 ASSESSMENT — PATIENT HEALTH QUESTIONNAIRE - PHQ9
1. LITTLE INTEREST OR PLEASURE IN DOING THINGS: 0
SUM OF ALL RESPONSES TO PHQ QUESTIONS 1-9: 0
SUM OF ALL RESPONSES TO PHQ QUESTIONS 1-9: 0
2. FEELING DOWN, DEPRESSED OR HOPELESS: 0
SUM OF ALL RESPONSES TO PHQ9 QUESTIONS 1 & 2: 0

## 2020-07-23 NOTE — PROGRESS NOTES
on file   Other Topics Concern    Not on file   Social History Narrative    Not on file       Family History   Problem Relation Age of Onset    Stroke Mother     Colon Cancer Mother     Diabetes Father     Heart Disease Brother     High Blood Pressure Brother     Colon Cancer Brother        Current Outpatient Medications   Medication Sig Dispense Refill    amLODIPine (NORVASC) 10 MG tablet Take 1 tablet by mouth daily 30 tablet 0    simvastatin (ZOCOR) 20 MG tablet TAKE 1 TABLET BY MOUTH IN THE EVENING 30 tablet 0    aspirin 81 MG chewable tablet Take 1 tablet by mouth daily 30 tablet 0    losartan (COZAAR) 25 MG tablet Take 1 tablet by mouth daily 90 tablet 1     No current facility-administered medications for this visit. Immunization History   Administered Date(s) Administered    Influenza, Quadv, IM, PF (6 mo and older Fluzone, Flulaval, Fluarix, and 3 yrs and older Afluria) 12/17/2018    Pneumococcal Polysaccharide ([id number]) 12/17/2018    Tdap (Boostrix, Adacel) 12/17/2018       Allergies   Allergen Reactions    Lisinopril Other (See Comments)     Dizzy and spaced out. Review of Systems   Constitutional: Negative for activity change, fatigue and fever. HENT: Negative for congestion and tinnitus. Eyes: Negative for visual disturbance. Respiratory: Negative for cough, chest tightness and shortness of breath. Cardiovascular: Negative for chest pain, palpitations and leg swelling. Gastrointestinal: Negative for abdominal pain, nausea and vomiting. Genitourinary: Negative for decreased urine volume and difficulty urinating. Skin: Negative for rash. Neurological: Negative for dizziness, weakness, light-headedness, numbness and headaches. Psychiatric/Behavioral: Negative for sleep disturbance. The patient is not nervous/anxious.         /70 (Site: Left Upper Arm, Position: Sitting, Cuff Size: Large Adult)   Pulse 58   Temp 98.1 °F (36.7 °C) (Temporal)   Ht 5' 8\" (1.727 m)   Wt 187 lb (84.8 kg)   SpO2 98%   BMI 28.43 kg/m²     Physical Exam  Vitals signs and nursing note reviewed. Constitutional:       General: He is not in acute distress. Appearance: He is well-developed. He is not diaphoretic. HENT:      Head: Normocephalic and atraumatic. Eyes:      Conjunctiva/sclera: Conjunctivae normal.      Pupils: Pupils are equal, round, and reactive to light. Neck:      Musculoskeletal: Normal range of motion and neck supple. Cardiovascular:      Rate and Rhythm: Normal rate and regular rhythm. Heart sounds: Normal heart sounds. No murmur. No friction rub. No gallop. Pulmonary:      Effort: Pulmonary effort is normal. No respiratory distress. Breath sounds: Normal breath sounds. No wheezing or rales. Chest:      Chest wall: No tenderness. Abdominal:      Palpations: Abdomen is soft. Musculoskeletal: Normal range of motion. Skin:     General: Skin is warm and dry. Capillary Refill: Capillary refill takes 2 to 3 seconds. Findings: No erythema. Neurological:      Mental Status: He is alert and oriented to person, place, and time. Cranial Nerves: No cranial nerve deficit. Psychiatric:         Behavior: Behavior normal.         Thought Content: Thought content normal.         Judgment: Judgment normal.         Plan  1. Essential hypertension  Stable. Continue with current care plan, which was discussed in detail with the patient. - Comprehensive Metabolic Panel; Future  - CBC Auto Differential; Future  - TSH with Reflex; Future    2. Healthcare maintenance  Discussed colonoscopy options, deferred   - Comprehensive Metabolic Panel; Future  - CBC Auto Differential; Future  - Hemoglobin A1C; Future  - HIV Screen; Future  - Hepatitis C Antibody; Future  - Lipid Panel; Future  - TSH with Reflex;  Future        I have spent 25 minutes of face to face time with the patient with more than 50% spent counseling and coordinating care for diagnoses and plans listed above. While assessing care for this patient, I have reviewed all pertinent lab work/imaging/ specialist notes and care in reference to those problems addressed above in detail. Appropriate medical decision making was based on this. Please note that portions of this note may have been completed with a voice recognition program. Efforts were made to edit the dictations but occasionally words are mis-transcribed. Return in about 6 months (around 1/23/2021) for follow up blood pressure, 30 minute visit.

## 2020-07-24 LAB
A/G RATIO: 2 (ref 1.1–2.2)
ALBUMIN SERPL-MCNC: 4.5 G/DL (ref 3.4–5)
ALP BLD-CCNC: 114 U/L (ref 40–129)
ALT SERPL-CCNC: 35 U/L (ref 10–40)
ANION GAP SERPL CALCULATED.3IONS-SCNC: 12 MMOL/L (ref 3–16)
AST SERPL-CCNC: 18 U/L (ref 15–37)
BILIRUB SERPL-MCNC: 0.3 MG/DL (ref 0–1)
BUN BLDV-MCNC: 17 MG/DL (ref 7–20)
CALCIUM SERPL-MCNC: 9.4 MG/DL (ref 8.3–10.6)
CHLORIDE BLD-SCNC: 102 MMOL/L (ref 99–110)
CHOLESTEROL, TOTAL: 162 MG/DL (ref 0–199)
CO2: 23 MMOL/L (ref 21–32)
CREAT SERPL-MCNC: 1 MG/DL (ref 0.8–1.3)
ESTIMATED AVERAGE GLUCOSE: 111.2 MG/DL
GFR AFRICAN AMERICAN: >60
GFR NON-AFRICAN AMERICAN: >60
GLOBULIN: 2.3 G/DL
GLUCOSE BLD-MCNC: 108 MG/DL (ref 70–99)
HBA1C MFR BLD: 5.5 %
HDLC SERPL-MCNC: 40 MG/DL (ref 40–60)
HEPATITIS C ANTIBODY INTERPRETATION: NORMAL
HIV AG/AB: NORMAL
HIV ANTIGEN: NORMAL
HIV-1 ANTIBODY: NORMAL
HIV-2 AB: NORMAL
LDL CHOLESTEROL CALCULATED: 64 MG/DL
POTASSIUM SERPL-SCNC: 4.3 MMOL/L (ref 3.5–5.1)
SODIUM BLD-SCNC: 137 MMOL/L (ref 136–145)
TOTAL PROTEIN: 6.8 G/DL (ref 6.4–8.2)
TRIGL SERPL-MCNC: 290 MG/DL (ref 0–150)
TSH REFLEX: 2.51 UIU/ML (ref 0.27–4.2)
VLDLC SERPL CALC-MCNC: 58 MG/DL

## 2020-08-10 RX ORDER — LOSARTAN POTASSIUM 25 MG/1
25 TABLET ORAL DAILY
Qty: 30 TABLET | Refills: 0 | Status: SHIPPED | OUTPATIENT
Start: 2020-08-10 | End: 2020-08-12 | Stop reason: SDUPTHER

## 2020-08-10 NOTE — TELEPHONE ENCOUNTER
Last office visit 7/23/2020     Last written 7-    Next office visit scheduled Not scheduled    Requested Prescriptions     Pending Prescriptions Disp Refills    losartan (COZAAR) 25 MG tablet 30 tablet 0     Sig: Take 1 tablet by mouth daily

## 2020-08-12 RX ORDER — LOSARTAN POTASSIUM 25 MG/1
25 TABLET ORAL DAILY
Qty: 90 TABLET | Refills: 1 | Status: SHIPPED | OUTPATIENT
Start: 2020-08-12 | End: 2021-02-12

## 2020-09-28 RX ORDER — AMLODIPINE BESYLATE 10 MG/1
10 TABLET ORAL DAILY
Qty: 90 TABLET | Refills: 1 | Status: SHIPPED | OUTPATIENT
Start: 2020-09-28 | End: 2021-03-29

## 2020-09-28 NOTE — TELEPHONE ENCOUNTER
Last office visit 7/23/2020     Last written  6-    Next office visit scheduled  Not scheduled    Requested Prescriptions     Pending Prescriptions Disp Refills    amLODIPine (NORVASC) 10 MG tablet 90 tablet 0     Sig: Take 1 tablet by mouth daily

## 2020-09-30 RX ORDER — SIMVASTATIN 20 MG
TABLET ORAL
Qty: 90 TABLET | Refills: 1 | Status: SHIPPED | OUTPATIENT
Start: 2020-09-30 | End: 2021-03-29

## 2020-09-30 NOTE — TELEPHONE ENCOUNTER
Last office visit 7/23/2020     Last written 4-1-2020    Next office visit scheduled  Not scheduled    Requested Prescriptions     Pending Prescriptions Disp Refills    simvastatin (ZOCOR) 20 MG tablet 90 tablet 0     Sig: TAKE 1 TABLET BY MOUTH IN THE EVENING

## 2021-02-11 NOTE — TELEPHONE ENCOUNTER
.  Last office visit 7/23/2020     Last written 8- 90 with 1      Next office visit scheduled Visit date not found    Requested Prescriptions     Pending Prescriptions Disp Refills    losartan (COZAAR) 25 MG tablet [Pharmacy Med Name: LOSARTAN POTASSIUM 25 MG TAB] 90 tablet 1     Sig: TAKE 1 TABLET BY MOUTH EVERY DAY

## 2021-02-12 RX ORDER — LOSARTAN POTASSIUM 25 MG/1
TABLET ORAL
Qty: 90 TABLET | Refills: 1 | Status: SHIPPED | OUTPATIENT
Start: 2021-02-12 | End: 2021-04-29 | Stop reason: SDUPTHER

## 2021-03-27 DIAGNOSIS — E78.2 MIXED HYPERLIPIDEMIA: ICD-10-CM

## 2021-03-27 DIAGNOSIS — I10 ESSENTIAL HYPERTENSION: ICD-10-CM

## 2021-03-29 RX ORDER — AMLODIPINE BESYLATE 10 MG/1
TABLET ORAL
Qty: 90 TABLET | Refills: 0 | Status: SHIPPED | OUTPATIENT
Start: 2021-03-29 | End: 2021-04-29 | Stop reason: SDUPTHER

## 2021-03-29 RX ORDER — SIMVASTATIN 20 MG
TABLET ORAL
Qty: 90 TABLET | Refills: 0 | Status: SHIPPED | OUTPATIENT
Start: 2021-03-29 | End: 2021-04-29 | Stop reason: SDUPTHER

## 2021-03-29 NOTE — TELEPHONE ENCOUNTER
Scheduled 4-29-21 with Dr Christopher Akhtar;  Wife would like to know if he can get refill until then; she thinks he will be out of amlodipine

## 2021-04-29 ENCOUNTER — OFFICE VISIT (OUTPATIENT)
Dept: FAMILY MEDICINE CLINIC | Age: 61
End: 2021-04-29
Payer: COMMERCIAL

## 2021-04-29 VITALS
HEART RATE: 61 BPM | WEIGHT: 187 LBS | DIASTOLIC BLOOD PRESSURE: 74 MMHG | BODY MASS INDEX: 28.34 KG/M2 | SYSTOLIC BLOOD PRESSURE: 139 MMHG | OXYGEN SATURATION: 97 % | HEIGHT: 68 IN

## 2021-04-29 DIAGNOSIS — I10 ESSENTIAL HYPERTENSION: ICD-10-CM

## 2021-04-29 DIAGNOSIS — E78.2 MIXED HYPERLIPIDEMIA: ICD-10-CM

## 2021-04-29 PROCEDURE — 99213 OFFICE O/P EST LOW 20 MIN: CPT | Performed by: STUDENT IN AN ORGANIZED HEALTH CARE EDUCATION/TRAINING PROGRAM

## 2021-04-29 RX ORDER — SIMVASTATIN 20 MG
TABLET ORAL
Qty: 90 TABLET | Refills: 1 | Status: SHIPPED | OUTPATIENT
Start: 2021-04-29 | End: 2022-01-03

## 2021-04-29 RX ORDER — AMLODIPINE BESYLATE 10 MG/1
10 TABLET ORAL DAILY
Qty: 90 TABLET | Refills: 1 | Status: SHIPPED | OUTPATIENT
Start: 2021-04-29 | End: 2022-01-05

## 2021-04-29 RX ORDER — LOSARTAN POTASSIUM 25 MG/1
25 TABLET ORAL DAILY
Qty: 90 TABLET | Refills: 1 | Status: SHIPPED | OUTPATIENT
Start: 2021-04-29 | End: 2022-03-01

## 2021-04-29 ASSESSMENT — PATIENT HEALTH QUESTIONNAIRE - PHQ9
1. LITTLE INTEREST OR PLEASURE IN DOING THINGS: 0
SUM OF ALL RESPONSES TO PHQ QUESTIONS 1-9: 0
2. FEELING DOWN, DEPRESSED OR HOPELESS: 0
SUM OF ALL RESPONSES TO PHQ QUESTIONS 1-9: 0

## 2021-04-29 NOTE — PROGRESS NOTES
Patient: Ayaka Busch is a 63 y.o. male who presents today with the following Chief Complaint(s):  Chief Complaint   Patient presents with    Blood Pressure Check     routine blood pressure check no concerns or questions          HPI     HTN  Norvasc, cozaar. NO AE.     HLD  On zocor. No AE    Current Outpatient Medications   Medication Sig Dispense Refill    amLODIPine (NORVASC) 10 MG tablet Take 1 tablet by mouth daily 90 tablet 1    losartan (COZAAR) 25 MG tablet Take 1 tablet by mouth daily 90 tablet 1    simvastatin (ZOCOR) 20 MG tablet TAKE 1 TABLET BY MOUTH EVERY DAY IN THE EVENING 90 tablet 1    aspirin 81 MG chewable tablet Take 1 tablet by mouth daily (Patient not taking: Reported on 4/29/2021) 30 tablet 0     No current facility-administered medications for this visit. Patient's past medical history, surgical history, family history, medications,  andallergies  were all reviewed and updated as appropriate today. Review of Systems  All other systems reviewed and negative    Physical Exam  Vitals signs reviewed. Constitutional:       Appearance: Normal appearance. HENT:      Head: Normocephalic and atraumatic. Cardiovascular:      Rate and Rhythm: Normal rate and regular rhythm. Pulmonary:      Effort: Pulmonary effort is normal.      Breath sounds: Normal breath sounds. Neurological:      General: No focal deficit present. Mental Status: He is alert and oriented to person, place, and time. Psychiatric:         Behavior: Behavior normal.         Thought Content: Thought content normal.       Vitals:    04/29/21 1608   BP: 139/74   Pulse: 61   SpO2: 97%       Assessment:  Encounter Diagnoses   Name Primary?  Essential hypertension     Mixed hyperlipidemia        Plan:  1. Essential hypertension  - amLODIPine (NORVASC) 10 MG tablet; Take 1 tablet by mouth daily  Dispense: 90 tablet; Refill: 1  - losartan (COZAAR) 25 MG tablet;  Take 1 tablet by mouth daily  Dispense: 90 tablet; Refill: 1    2. Mixed hyperlipidemia  - simvastatin (ZOCOR) 20 MG tablet; TAKE 1 TABLET BY MOUTH EVERY DAY IN THE EVENING  Dispense: 90 tablet; Refill: 1    Doing well on current medications. Will continue. No follow-ups on file.

## 2022-01-05 DIAGNOSIS — I10 ESSENTIAL HYPERTENSION: ICD-10-CM

## 2022-01-05 RX ORDER — AMLODIPINE BESYLATE 10 MG/1
TABLET ORAL
Qty: 90 TABLET | Refills: 1 | Status: SHIPPED | OUTPATIENT
Start: 2022-01-05 | End: 2022-05-31

## 2022-01-05 NOTE — TELEPHONE ENCOUNTER
Refill Request     Last Seen: Last Seen Department: 4/29/2021  Last Seen by PCP: 4/29/2021    Last Written: 4/29/2021    Next Appointment:   No future appointments.           Requested Prescriptions     Pending Prescriptions Disp Refills    amLODIPine (NORVASC) 10 MG tablet [Pharmacy Med Name: AMLODIPINE BESYLATE 10 MG TAB] 90 tablet 1     Sig: TAKE 1 TABLET BY MOUTH EVERY DAY

## 2022-01-06 DIAGNOSIS — E78.2 MIXED HYPERLIPIDEMIA: ICD-10-CM

## 2022-01-06 RX ORDER — SIMVASTATIN 20 MG
TABLET ORAL
Qty: 90 TABLET | Refills: 1 | Status: SHIPPED | OUTPATIENT
Start: 2022-01-06 | End: 2022-05-28

## 2022-01-06 NOTE — TELEPHONE ENCOUNTER
Refill Request     Last Seen: Last Seen Department: 4/29/2021  Last Seen by PCP: 7/23/2020    Last Written: 1/3/2022 #30    Next Appointment:   No future appointments.     Insurance requires 90 days      Requested Prescriptions     Pending Prescriptions Disp Refills    simvastatin (ZOCOR) 20 MG tablet 90 tablet 1     Sig: TAKE 1 TABLET BY MOUTH EVERY DAY IN THE EVENING

## 2022-03-01 DIAGNOSIS — I10 ESSENTIAL HYPERTENSION: ICD-10-CM

## 2022-03-01 RX ORDER — LOSARTAN POTASSIUM 25 MG/1
TABLET ORAL
Qty: 90 TABLET | Refills: 1 | Status: SHIPPED | OUTPATIENT
Start: 2022-03-01 | End: 2022-03-10 | Stop reason: SDUPTHER

## 2022-03-01 NOTE — TELEPHONE ENCOUNTER
Refill Request     Last Seen: Last Seen Department: 4/29/2021  Last Seen by PCP: 4/29/2021    Last Written: 4/29/2021 for #90 tablet with #1 refill    Next Appointment:   No future appointments. Message to Inuvo to schedule appointment.    Due for annual physical around 4/29/2022      Requested Prescriptions     Pending Prescriptions Disp Refills    losartan (COZAAR) 25 MG tablet [Pharmacy Med Name: LOSARTAN POTASSIUM 25 MG TAB] 90 tablet 1     Sig: TAKE 1 TABLET BY MOUTH EVERY DAY

## 2022-03-04 ENCOUNTER — TELEPHONE (OUTPATIENT)
Dept: FAMILY MEDICINE CLINIC | Age: 62
End: 2022-03-04

## 2022-03-04 NOTE — TELEPHONE ENCOUNTER
LM for patient to schedule physical at the end of April; Spouse states he will call back later today or Monday

## 2022-03-04 NOTE — TELEPHONE ENCOUNTER
----- Message from Guevarayoanna Everett sent at 3/4/2022  1:36 PM EST -----  Subject: Appointment Request    Reason for Call: Routine Physical Exam    QUESTIONS  Type of Appointment? Established Patient  Reason for appointment request? Requested Provider unavailable - Andrez Bradford  Additional Information for Provider? PATIENT EEN PROVIDER LAST TIME AND   STATES THAT HE WOULD LIKE TO STICK WITH HIM AS HIS PCP DUE TO FEELING MORE   COMFORTABLE WITH A MALE PROVIDER. PLEASE CALL AND LET PATIENT KNOW. HE IS   DUE FOR A PHYSICAL AT THE END ON ARIL AND IS NEED OF REFILLS ON BP   MEDICATION   ---------------------------------------------------------------------------  --------------  CALL BACK INFO  What is the best way for the office to contact you? OK to leave message on   voicemail  Preferred Call Back Phone Number? [phone]  ---------------------------------------------------------------------------  --------------  SCRIPT ANSWERS  Relationship to Patient? Other  Representative Name? 1996 [address]   Additional information verified (besides Name and Date of Birth)? Address  (If the patient has Medicare as their primary insurance coverage ask this   question) Are you requesting a Medicare Annual Wellness Visit? No  (Is the patient requesting a pap smear with their physical exam?)? No  (Is the patient requesting their annual physical and does not need PAP or   AWV per above?)? Yes   Have you been diagnosed with, awaiting test results for, or told that you   are suspected of having COVID-19 (Coronavirus)? (If patient has tested   negative or was tested as a requirement for work, school, or travel and   not based on symptoms, answer no)? No  Within the past 10 days have you developed any of the following symptoms   (answer no if symptoms have been present longer than 10 days or began   more than 10 days ago)?  Fever or Chills, Cough, Shortness of breath or   difficulty breathing, Loss of taste or smell, Sore throat, Nasal congestion, Sneezing or runny nose, Fatigue or generalized body aches   (answer no if pain is specific to a body part e.g. back pain), Diarrhea,   Headache? No  Have you had close contact with someone with COVID-19 in the last 7 days? No  (Service Expert  click yes below to proceed with [employer] As Usual   Scheduling)?  Yes

## 2022-03-04 NOTE — TELEPHONE ENCOUNTER
Patient seen Dr. Diego Murphy at his last appointment. He is requesting to switch to him due to he is more comfortable with a Male provider. Please advise if both are agreeable.

## 2022-03-07 NOTE — TELEPHONE ENCOUNTER
Tried calling pt but home number continued to ring and when calling the mobile number never got an answer or a vm to leave a message. Calling pt to schedule in a 30 min apt with Dr. Michelle Rashid .

## 2022-03-10 ENCOUNTER — OFFICE VISIT (OUTPATIENT)
Dept: FAMILY MEDICINE CLINIC | Age: 62
End: 2022-03-10
Payer: COMMERCIAL

## 2022-03-10 VITALS — DIASTOLIC BLOOD PRESSURE: 80 MMHG | OXYGEN SATURATION: 98 % | SYSTOLIC BLOOD PRESSURE: 138 MMHG | HEART RATE: 85 BPM

## 2022-03-10 DIAGNOSIS — I10 ESSENTIAL HYPERTENSION: Primary | ICD-10-CM

## 2022-03-10 DIAGNOSIS — R73.03 PREDIABETES: ICD-10-CM

## 2022-03-10 DIAGNOSIS — E78.2 MIXED HYPERLIPIDEMIA: ICD-10-CM

## 2022-03-10 PROCEDURE — 99213 OFFICE O/P EST LOW 20 MIN: CPT | Performed by: STUDENT IN AN ORGANIZED HEALTH CARE EDUCATION/TRAINING PROGRAM

## 2022-03-10 RX ORDER — LOSARTAN POTASSIUM 25 MG/1
25 TABLET ORAL DAILY
Qty: 90 TABLET | Refills: 1 | Status: SHIPPED | OUTPATIENT
Start: 2022-03-10

## 2022-03-10 SDOH — ECONOMIC STABILITY: HOUSING INSECURITY: IN THE LAST 12 MONTHS, HOW MANY PLACES HAVE YOU LIVED?: 1

## 2022-03-10 SDOH — ECONOMIC STABILITY: FOOD INSECURITY: WITHIN THE PAST 12 MONTHS, YOU WORRIED THAT YOUR FOOD WOULD RUN OUT BEFORE YOU GOT MONEY TO BUY MORE.: NEVER TRUE

## 2022-03-10 SDOH — ECONOMIC STABILITY: TRANSPORTATION INSECURITY
IN THE PAST 12 MONTHS, HAS THE LACK OF TRANSPORTATION KEPT YOU FROM MEDICAL APPOINTMENTS OR FROM GETTING MEDICATIONS?: NO

## 2022-03-10 SDOH — ECONOMIC STABILITY: TRANSPORTATION INSECURITY
IN THE PAST 12 MONTHS, HAS LACK OF TRANSPORTATION KEPT YOU FROM MEETINGS, WORK, OR FROM GETTING THINGS NEEDED FOR DAILY LIVING?: NO

## 2022-03-10 SDOH — ECONOMIC STABILITY: HOUSING INSECURITY
IN THE LAST 12 MONTHS, WAS THERE A TIME WHEN YOU DID NOT HAVE A STEADY PLACE TO SLEEP OR SLEPT IN A SHELTER (INCLUDING NOW)?: NO

## 2022-03-10 SDOH — ECONOMIC STABILITY: INCOME INSECURITY: IN THE LAST 12 MONTHS, WAS THERE A TIME WHEN YOU WERE NOT ABLE TO PAY THE MORTGAGE OR RENT ON TIME?: NO

## 2022-03-10 SDOH — ECONOMIC STABILITY: FOOD INSECURITY: WITHIN THE PAST 12 MONTHS, THE FOOD YOU BOUGHT JUST DIDN'T LAST AND YOU DIDN'T HAVE MONEY TO GET MORE.: NEVER TRUE

## 2022-03-10 ASSESSMENT — ENCOUNTER SYMPTOMS
NAUSEA: 0
VOMITING: 0
DIARRHEA: 0
SHORTNESS OF BREATH: 0
CONSTIPATION: 0

## 2022-03-10 ASSESSMENT — SOCIAL DETERMINANTS OF HEALTH (SDOH): HOW HARD IS IT FOR YOU TO PAY FOR THE VERY BASICS LIKE FOOD, HOUSING, MEDICAL CARE, AND HEATING?: NOT HARD AT ALL

## 2022-03-10 NOTE — PROGRESS NOTES
3/10/2022    Herbie Pelayo (:  1960) is a 57 y.o. male, here for evaluation of the following medical concerns:    HPI    HTN  Losartan 25mg  Amlodipine 10    HLD  zocor 20    Prediabetes  5.8 18    Smoking  4-5 cigarettes a day  Would like to quit        Review of Systems   Constitutional: Negative for chills and fever. Eyes: Negative for visual disturbance. Respiratory: Negative for shortness of breath. Cardiovascular: Negative for chest pain and palpitations. Gastrointestinal: Negative for constipation, diarrhea, nausea and vomiting. Genitourinary: Negative for difficulty urinating. Musculoskeletal: Negative for gait problem. Skin: Negative for rash. Neurological: Negative for dizziness and light-headedness. Psychiatric/Behavioral: Negative for confusion and decreased concentration. wears contacts  Follows with dentist    All other systems reviewed and negative    Prior to Visit Medications    Medication Sig Taking? Authorizing Provider   losartan (COZAAR) 25 MG tablet TAKE 1 TABLET BY MOUTH EVERY DAY Yes STANISLAW Celestin   simvastatin (ZOCOR) 20 MG tablet TAKE 1 TABLET BY MOUTH EVERY DAY IN THE EVENING  Patient taking differently: 20 mg nightly TAKE 1 TABLET BY MOUTH EVERY DAY IN THE EVENING Yes Raquel Kumar MD   amLODIPine (NORVASC) 10 MG tablet TAKE 1 TABLET BY MOUTH EVERY DAY Yes Raquel Kumar MD   aspirin 81 MG chewable tablet Take 1 tablet by mouth daily  Patient not taking: Reported on 2021  Lc Rice MD        Allergies   Allergen Reactions    Lisinopril Other (See Comments)     Dizzy and spaced out. No past medical history on file.     Past Surgical History:   Procedure Laterality Date    FRACTURE SURGERY      Leg and foot     LEG SURGERY Left     kahlil in leg, broke tib/fib       Social History     Socioeconomic History    Marital status:      Spouse name: Not on file    Number of children: Not on file    Years of education: Not on file    Highest education level: Not on file   Occupational History    Not on file   Tobacco Use    Smoking status: Current Every Day Smoker     Packs/day: 1.00     Years: 30.00     Pack years: 30.00     Types: Cigarettes    Smokeless tobacco: Current User     Types: Chew   Vaping Use    Vaping Use: Never used   Substance and Sexual Activity    Alcohol use: Yes     Comment: 6-8 beers per day    Drug use: No    Sexual activity: Not on file   Other Topics Concern    Not on file   Social History Narrative    Not on file     Social Determinants of Health     Financial Resource Strain: Low Risk     Difficulty of Paying Living Expenses: Not hard at all   Food Insecurity: No Food Insecurity    Worried About Running Out of Food in the Last Year: Never true    [place] of Food in the Last Year: Never true   Transportation Needs: No Transportation Needs    Lack of Transportation (Medical): No    Lack of Transportation (Non-Medical):  No   Physical Activity:     Days of Exercise per Week: Not on file    Minutes of Exercise per Session: Not on file   Stress:     Feeling of Stress : Not on file   Social Connections:     Frequency of Communication with Friends and Family: Not on file    Frequency of Social Gatherings with Friends and Family: Not on file    Attends [employer] Services: Not on file    Active Member of Clubs or Organizations: Not on file    Attends Club or Organization Meetings: Not on file    Marital Status: Not on file   Intimate Partner Violence:     Fear of Current or Ex-Partner: Not on file    Emotionally Abused: Not on file    Physically Abused: Not on file    Sexually Abused: Not on file   Housing Stability: [address] Unable to Pay for Housing in the Last Year: No    Number of [place] in the Last Year: 1    Unstable Housing in the Last Year: No        Family History   Problem Relation Age of Onset    Stroke Mother     Colon Cancer Mother     Diabetes Father     Heart Disease Brother     High Blood Pressure Brother     Colon Cancer Brother        Vitals:    03/10/22 1633   BP: 138/80   Pulse: 85   SpO2: 98%     Estimated body mass index is 28.43 kg/m² as calculated from the following:    Height as of 4/29/21: 5' 8\" (1.727 m). Weight as of 4/29/21: 187 lb (84.8 kg). Physical Exam  Vitals reviewed. Constitutional:       Appearance: Normal appearance. HENT:      Head: Normocephalic and atraumatic. Cardiovascular:      Rate and Rhythm: Normal rate and regular rhythm. Pulmonary:      Effort: Pulmonary effort is normal.      Breath sounds: Normal breath sounds. Neurological:      General: No focal deficit present. Mental Status: He is alert and oriented to person, place, and time. Psychiatric:         Behavior: Behavior normal.         Thought Content: Thought content normal.         ASSESSMENT/PLAN:  1. Essential hypertension  At goal today  - Comprehensive Metabolic Panel; Future    2. Prediabetes  Will recheck  - Hemoglobin A1C; Future    3. Mixed hyperlipidemia  Recheck  - Lipid Panel; Future    Low dose Ct - declines  Colon cancer screening - declines  Shingles - declines at this time. No follow-ups on file. An  electronic signature was used to authenticate this note.     --Jose Sher, DO on 3/10/2022 at 4:54 PM

## 2022-05-28 DIAGNOSIS — I10 ESSENTIAL HYPERTENSION: ICD-10-CM

## 2022-05-28 DIAGNOSIS — E78.2 MIXED HYPERLIPIDEMIA: ICD-10-CM

## 2022-05-29 NOTE — TELEPHONE ENCOUNTER
Refill Request     CONFIRM preferrred pharmacy with the patient. If Mail Order Rx - Pend for 90 day refill. Last Seen: Last Seen Department: 3/10/2022  Last Seen by PCP: 7/23/2020    Last Written: 1/5/2022 Amlodipine  1/6/2022 Simvastatin    Next Appointment:   No future appointments.           Requested Prescriptions     Pending Prescriptions Disp Refills    amLODIPine (NORVASC) 10 MG tablet [Pharmacy Med Name: AMLODIPINE BESYLATE 10 MG TAB] 90 tablet 1     Sig: TAKE 1 TABLET BY MOUTH EVERY DAY    simvastatin (ZOCOR) 20 MG tablet [Pharmacy Med Name: SIMVASTATIN 20 MG TABLET] 90 tablet 1     Sig: TAKE 1 TABLET BY MOUTH EVERY DAY IN THE EVENING

## 2022-05-31 RX ORDER — SIMVASTATIN 20 MG
TABLET ORAL
Qty: 90 TABLET | Refills: 1 | Status: SHIPPED | OUTPATIENT
Start: 2022-05-31

## 2022-05-31 RX ORDER — AMLODIPINE BESYLATE 10 MG/1
TABLET ORAL
Qty: 90 TABLET | Refills: 1 | Status: SHIPPED | OUTPATIENT
Start: 2022-05-31

## 2022-08-11 ENCOUNTER — TELEMEDICINE (OUTPATIENT)
Dept: PRIMARY CARE CLINIC | Age: 62
End: 2022-08-11
Payer: COMMERCIAL

## 2022-08-11 DIAGNOSIS — U07.1 COVID-19: Primary | ICD-10-CM

## 2022-08-11 PROCEDURE — 3017F COLORECTAL CA SCREEN DOC REV: CPT | Performed by: NURSE PRACTITIONER

## 2022-08-11 PROCEDURE — 99213 OFFICE O/P EST LOW 20 MIN: CPT | Performed by: NURSE PRACTITIONER

## 2022-08-11 PROCEDURE — G8427 DOCREV CUR MEDS BY ELIG CLIN: HCPCS | Performed by: NURSE PRACTITIONER

## 2022-08-11 ASSESSMENT — ENCOUNTER SYMPTOMS
WHEEZING: 0
COUGH: 0
RHINORRHEA: 0
CHEST TIGHTNESS: 0
GASTROINTESTINAL NEGATIVE: 1
SORE THROAT: 0
SHORTNESS OF BREATH: 0

## 2022-08-11 NOTE — PROGRESS NOTES
2022    TELEHEALTH EVALUATION -- Audio/Visual (During CUFJN-27 public health emergency)    HPI:    Maco Nolen (:  1960) has requested an audio/video evaluation for the following concern(s):    COVID. Tested yesterday and was negative today and is positive. Symptoms body ache, fatigue, especially his back, fever 101 and chills. No cough, congestion, SOB,or wheezing. Patient has HTN, Hyperlipidemia, and Prediabetes. Has been vaccinated. Review of Systems   Constitutional:  Positive for chills, fatigue and fever. HENT: Negative. Negative for postnasal drip, rhinorrhea and sore throat. Respiratory:  Negative for cough, chest tightness, shortness of breath and wheezing. Cardiovascular: Negative. Gastrointestinal: Negative. Genitourinary: Negative. Musculoskeletal:  Positive for myalgias. Skin: Negative. Neurological: Negative. Psychiatric/Behavioral: Negative. Prior to Visit Medications    Medication Sig Taking? Authorizing Provider   nirmatrelvir/ritonavir (PAXLOVID) 20 x 150 MG & 10 x 100MG Take 3 tablets (two 150 mg nirmatrelvir and one 100 mg ritonavir tablets) by mouth every 12 hours for 5 days. STOP SIMVASTATIN FOR 5 DAYS WHILE TAKING PAXLOVID. MAY RESUME SIMVASTATIN 3 DAYS AFTER YOU FINISH WITH PAXLOVID.  Yes STANISLAW Mac - CNP   amLODIPine (NORVASC) 10 MG tablet TAKE 1 TABLET BY MOUTH EVERY DAY  Renetta Russo DO   simvastatin (ZOCOR) 20 MG tablet TAKE 1 TABLET BY MOUTH EVERY DAY IN THE EVENING  Renetta Russo DO   losartan (COZAAR) 25 MG tablet Take 1 tablet by mouth daily  Tawanda Kincaid DO       Social History     Tobacco Use    Smoking status: Every Day     Packs/day: 1.00     Years: 30.00     Pack years: 30.00     Types: Cigarettes    Smokeless tobacco: Current     Types: Chew   Vaping Use    Vaping Use: Never used   Substance Use Topics    Alcohol use: Yes     Comment: 6-8 beers per day    Drug use: No        Allergies   Allergen Reactions Lisinopril Other (See Comments)     Dizzy and spaced out.    , No past medical history on file.,   Past Surgical History:   Procedure Laterality Date    FRACTURE SURGERY      Leg and foot     LEG SURGERY Left 2007    kahlil in leg, broke tib/fib   ,   Social History     Tobacco Use    Smoking status: Every Day     Packs/day: 1.00     Years: 30.00     Pack years: 30.00     Types: Cigarettes    Smokeless tobacco: Current     Types: Chew   Vaping Use    Vaping Use: Never used   Substance Use Topics    Alcohol use: Yes     Comment: 6-8 beers per day    Drug use: No   ,   Family History   Problem Relation Age of Onset    Stroke Mother     Colon Cancer Mother     Diabetes Father     Heart Disease Brother     High Blood Pressure Brother     Colon Cancer Brother    ,   Immunization History   Administered Date(s) Administered    COVID-19, J&J, (age 18y+), IM, 0.5 mL 03/24/2021    Influenza, Quadv, IM, PF (6 mo and older Fluzone, Flulaval, Fluarix, and 3 yrs and older Afluria) 12/17/2018    Pneumococcal Polysaccharide ([id number]) 12/17/2018    Tdap (Boostrix, Adacel) 12/17/2018       PHYSICAL EXAMINATION:  [ INSTRUCTIONS:  \"[x]\" Indicates a positive item  \"[]\" Indicates a negative item  -- DELETE ALL ITEMS NOT EXAMINED]  Vital Signs: (As obtained by patient/caregiver or practitioner observation)    Blood pressure-  Heart rate-    Respiratory rate-    Temperature-  Pulse oximetry-     Constitutional: [x] Appears well-developed and well-nourished [x] No apparent distress      [x] Abnormal- appears ill  Mental status  [x] Alert and awake  [x] Oriented to person/place/time [x]Able to follow commands      Eyes:  EOM    [x]  Normal  [] Abnormal-  Sclera  [x]  Normal  [] Abnormal -         Discharge [x]  None visible  [] Abnormal -    HENT:   [x] Normocephalic, atraumatic.   [] Abnormal   [] Mouth/Throat: Mucous membranes are moist.     External Ears [x] Normal  [] Abnormal-     Neck: [x] No visualized mass     Pulmonary/Chest: [x] Respiratory effort normal.  [x] No visualized signs of difficulty breathing or respiratory distress        [] Abnormal-      Musculoskeletal:   [] Normal gait with no signs of ataxia         [x] Normal range of motion of neck        [] Abnormal-       Neurological:        [x] No Facial Asymmetry (Cranial nerve 7 motor function) (limited exam to video visit)          [x] No gaze palsy        [] Abnormal-         Skin:        [x] No significant exanthematous lesions or discoloration noted on facial skin         [] Abnormal-            Psychiatric:       [x] Normal Affect [] No Hallucinations        [] Abnormal-     Other pertinent observable physical exam findings-     ASSESSMENT/PLAN:  1. COVID-19    Your recent COVID test was positive. You are a candidate for Paxlovid. Allergies, Medications, and labs reviewed. STOP SIMVASTATIN FOR 5 DAYS WHILE TAKING PAXLOVID. MAY RESUME SIMVASTATIN 3DAYS AFTER YOU FINISH WITH PAXLOVID. Do not recommend drinking alcohol while taking Paxlovid. Currently, the CDC recommends staying at home in self isolation for at least 5 days. After that, if you are without a fever and symptoms are improving, you may go out, but only if wearing a mask for an additional 5 days. Thankfully, most people recover uneventfully. The mainstay of treatment for most people remains focused on symptom control, isolating and watching for signs of worsening illness. You should drink plenty of fluids, eat when you are able, and rest as much as possible. Recommend treating symptoms with OTC medications: Flonase for congestions, Mucinex for Phlegm, Robitussin DM or Delsym for cough, Tylenol/Ibuprofen for fever/body aches. Recommend Vitamin D, C and Zinc.    We do not prescribe antibiotics for viral illnesses; however, we can treat secondary infections should the need arise.     Please seek more urgent medical attention if you develop any of the following:  Chest pain  Shortness of breath  Bluish lips or face  Severe headache   Severe dizziness or passing out  New confusion  Inability to stay awake  Extreme weakness    If you have a pulse oximeter, check it at least daily. Seek urgent medical attention if it drops to 92% or below. - nirmatrelvir/ritonavir (PAXLOVID) 20 x 150 MG & 10 x 100MG; Take 3 tablets (two 150 mg nirmatrelvir and one 100 mg ritonavir tablets) by mouth every 12 hours for 5 days. STOP SIMVASTATIN FOR 5 DAYS WHILE TAKING PAXLOVID. MAY RESUME SIMVASTATIN 3 DAYS AFTER YOU FINISH WITH PAXLOVID. Dispense: 30 tablet; Refill: 0    Return with PCP if symptoms worsen or fail to improve. [employer], was evaluated through a synchronous (real-time) audio-video encounter. The patient (or guardian if applicable) is aware that this is a billable service, which includes applicable co-pays. This Virtual Visit was conducted with patient's (and/or legal guardian's) consent. The visit was conducted pursuant to the emergency declaration under the [address] [place] [address], [address] [place] [address] authority and the [employer] and [employer] General Act. Patient identification was verified, and a caregiver was present when appropriate. The patient was located at Home: [place] [address]. Provider was located at Other: Home:[place] . Total time spent on this encounter:  25 minutes    --STANISLAW Orantes CNP on 8/11/2022 at 1:35 PM    An electronic signature was used to authenticate this note.

## 2022-08-11 NOTE — LETTER
I had the pleasure of seeing Sima Florentino today for a primary care virtualist video visit secondary to Kayla. I have provided the following recommendations: Please see note. I have included my note for your review and have asked the patient to follow up with you if no improvement or worsening of symptoms. If you have questions, please reach out via [employer] secure messaging by searching for the [facility] Primary Care Virtualists. Your communication will be answered promptly by the Virtualist on service for the day. Additionally, we would love your overall feedback on this visit. Please hit shift and click the following link to let us know if the Virtualist service met your expectations. [employer] [id number]      Electronically signed by STANISLAW Morton CNP on 8/11/22 at 1:33 PM EDT.

## 2022-08-11 NOTE — PATIENT INSTRUCTIONS
Your recent COVID test was positive. You are a candidate for Paxlovid. Allergies, Medications, and labs reviewed. STOP SIMVASTATIN FOR 5 DAYS WHILE TAKING PAXLOVID. MAY RESUME SIMVASTATIN 3DAYS AFTER YOU FINISH WITH PAXLOVID. Do not recommend drinking alcohol while taking Paxlovid. Currently, the CDC recommends staying at home in self isolation for at least 5 days. After that, if you are without a fever and symptoms are improving, you may go out, but only if wearing a mask for an additional 5 days. Thankfully, most people recover uneventfully. The mainstay of treatment for most people remains focused on symptom control, isolating and watching for signs of worsening illness. You should drink plenty of fluids, eat when you are able, and rest as much as possible. Recommend treating symptoms with OTC medications: Flonase for congestions, Mucinex for Phlegm, Robitussin DM or Delsym for cough, Tylenol/Ibuprofen for fever/body aches. Recommend Vitamin D, C and Zinc.    We do not prescribe antibiotics for viral illnesses; however, we can treat secondary infections should the need arise. Please seek more urgent medical attention if you develop any of the following:  Chest pain  Shortness of breath  Bluish lips or face  Severe headache   Severe dizziness or passing out  New confusion  Inability to stay awake  Extreme weakness    If you have a pulse oximeter, check it at least daily. Seek urgent medical attention if it drops to 92% or below.

## 2022-12-16 DIAGNOSIS — I10 ESSENTIAL HYPERTENSION: ICD-10-CM

## 2022-12-19 RX ORDER — LOSARTAN POTASSIUM 25 MG/1
TABLET ORAL
Qty: 90 TABLET | Refills: 1 | Status: SHIPPED | OUTPATIENT
Start: 2022-12-19

## 2023-01-08 DIAGNOSIS — I10 ESSENTIAL HYPERTENSION: ICD-10-CM

## 2023-01-09 RX ORDER — AMLODIPINE BESYLATE 10 MG/1
TABLET ORAL
Qty: 90 TABLET | Refills: 1 | Status: SHIPPED | OUTPATIENT
Start: 2023-01-09

## 2023-01-09 NOTE — TELEPHONE ENCOUNTER
Refill Request     CONFIRM preferrred pharmacy with the patient. If Mail Order Rx - Pend for 90 day refill. Last Seen: Last Seen Department: 3/10/2022  Last Seen by PCP: 3/10/2022    Last Written: 05/31/2022 90 tablet 1 refills     If no future appointment scheduled, route STAFF MESSAGE with patient name to the [facility] for scheduling. Next Appointment:   Future Appointments   Date Time Provider Jd Tellez   2/1/2023 10:30 AM DO KEISHA Telles - [place]       Message sent to  to schedule appt with patient?   NO      Requested Prescriptions     Pending Prescriptions Disp Refills    amLODIPine (NORVASC) 10 MG tablet [Pharmacy Med Name: AMLODIPINE BESYLATE 10 MG TAB] 90 tablet 1     Sig: TAKE 1 TABLET BY MOUTH EVERY DAY

## 2023-02-01 ENCOUNTER — OFFICE VISIT (OUTPATIENT)
Dept: FAMILY MEDICINE CLINIC | Age: 63
End: 2023-02-01
Payer: COMMERCIAL

## 2023-02-01 VITALS
OXYGEN SATURATION: 99 % | HEIGHT: 68 IN | SYSTOLIC BLOOD PRESSURE: 134 MMHG | BODY MASS INDEX: 27.58 KG/M2 | DIASTOLIC BLOOD PRESSURE: 70 MMHG | WEIGHT: 182 LBS | HEART RATE: 58 BPM

## 2023-02-01 DIAGNOSIS — R73.03 PREDIABETES: ICD-10-CM

## 2023-02-01 DIAGNOSIS — I10 ESSENTIAL HYPERTENSION: ICD-10-CM

## 2023-02-01 DIAGNOSIS — Z00.00 ENCOUNTER FOR WELL ADULT EXAM WITHOUT ABNORMAL FINDINGS: Primary | ICD-10-CM

## 2023-02-01 DIAGNOSIS — E78.2 MIXED HYPERLIPIDEMIA: ICD-10-CM

## 2023-02-01 DIAGNOSIS — M79.18 PAIN OF RIGHT DELTOID: ICD-10-CM

## 2023-02-01 LAB
A/G RATIO: 2 (ref 1.1–2.2)
ALBUMIN SERPL-MCNC: 4.3 G/DL (ref 3.4–5)
ALP BLD-CCNC: 102 U/L (ref 40–129)
ALT SERPL-CCNC: 22 U/L (ref 10–40)
ANION GAP SERPL CALCULATED.3IONS-SCNC: 13 MMOL/L (ref 3–16)
AST SERPL-CCNC: 16 U/L (ref 15–37)
BILIRUB SERPL-MCNC: 0.4 MG/DL (ref 0–1)
BUN BLDV-MCNC: 11 MG/DL (ref 7–20)
CALCIUM SERPL-MCNC: 9.3 MG/DL (ref 8.3–10.6)
CHLORIDE BLD-SCNC: 101 MMOL/L (ref 99–110)
CHOLESTEROL, TOTAL: 199 MG/DL (ref 0–199)
CO2: 25 MMOL/L (ref 21–32)
CREAT SERPL-MCNC: 1 MG/DL (ref 0.8–1.3)
GFR SERPL CREATININE-BSD FRML MDRD: >60 ML/MIN/{1.73_M2}
GLUCOSE BLD-MCNC: 137 MG/DL (ref 70–99)
HDLC SERPL-MCNC: 46 MG/DL (ref 40–60)
LDL CHOLESTEROL CALCULATED: 118 MG/DL
POTASSIUM SERPL-SCNC: 4.8 MMOL/L (ref 3.5–5.1)
SODIUM BLD-SCNC: 139 MMOL/L (ref 136–145)
TOTAL PROTEIN: 6.5 G/DL (ref 6.4–8.2)
TRIGL SERPL-MCNC: 174 MG/DL (ref 0–150)
VLDLC SERPL CALC-MCNC: 35 MG/DL

## 2023-02-01 PROCEDURE — 99396 PREV VISIT EST AGE 40-64: CPT | Performed by: STUDENT IN AN ORGANIZED HEALTH CARE EDUCATION/TRAINING PROGRAM

## 2023-02-01 PROCEDURE — 3074F SYST BP LT 130 MM HG: CPT | Performed by: STUDENT IN AN ORGANIZED HEALTH CARE EDUCATION/TRAINING PROGRAM

## 2023-02-01 PROCEDURE — 3078F DIAST BP <80 MM HG: CPT | Performed by: STUDENT IN AN ORGANIZED HEALTH CARE EDUCATION/TRAINING PROGRAM

## 2023-02-01 PROCEDURE — G8484 FLU IMMUNIZE NO ADMIN: HCPCS | Performed by: STUDENT IN AN ORGANIZED HEALTH CARE EDUCATION/TRAINING PROGRAM

## 2023-02-01 ASSESSMENT — PATIENT HEALTH QUESTIONNAIRE - PHQ9
SUM OF ALL RESPONSES TO PHQ QUESTIONS 1-9: 0
SUM OF ALL RESPONSES TO PHQ QUESTIONS 1-9: 0
1. LITTLE INTEREST OR PLEASURE IN DOING THINGS: 0
SUM OF ALL RESPONSES TO PHQ9 QUESTIONS 1 & 2: 0
SUM OF ALL RESPONSES TO PHQ QUESTIONS 1-9: 0
SUM OF ALL RESPONSES TO PHQ QUESTIONS 1-9: 0
2. FEELING DOWN, DEPRESSED OR HOPELESS: 0

## 2023-02-01 ASSESSMENT — ENCOUNTER SYMPTOMS
SHORTNESS OF BREATH: 0
VOMITING: 0
DIARRHEA: 0
CONSTIPATION: 0
NAUSEA: 0

## 2023-02-01 NOTE — PROGRESS NOTES
[facility] Adult Note  Name: Wendi Conenll Date: 2023   MRN: [id number] Sex: Male   Age: 60 y.o. Ethnicity: Non- / Non    : 1960 Race: White (non-)      Juwan Durham is here for well adult exam.  History:    HTN  Norvasc 10  Cozaar 25    HLD  Still has simvistatin but does not routinely remember to take    Smoking   0.5 ppd    Working in maintenance 6 days a week  Reports that he has noticed increased pain over lateral shoulder recently. No loss of strenght but increased pain with lateral arm raise. Low dose Ct - declines  Colon cancer screening - declines  Shingles - declines at this time. Review of Systems   Constitutional:  Negative for chills and fever. Respiratory:  Negative for shortness of breath. Cardiovascular:  Negative for chest pain and palpitations. Gastrointestinal:  Negative for constipation, diarrhea, nausea and vomiting. Allergies   Allergen Reactions    Lisinopril Other (See Comments)     Dizzy and spaced out. Prior to Visit Medications    Medication Sig Taking? Authorizing Provider   amLODIPine (NORVASC) 10 MG tablet TAKE 1 TABLET BY MOUTH EVERY DAY Yes Gini Russo DO   losartan (COZAAR) 25 MG tablet TAKE 1 TABLET BY MOUTH EVERY DAY Yes Gini Russo DO   simvastatin (ZOCOR) 20 MG tablet TAKE 1 TABLET BY MOUTH EVERY DAY IN THE EVENING Yes Gini Russo DO       No past medical history on file.     Past Surgical History:   Procedure Laterality Date    FRACTURE SURGERY      Leg and foot     LEG SURGERY Left     kahlil in leg, broke tib/fib         Family History   Problem Relation Age of Onset    Stroke Mother     Colon Cancer Mother     Diabetes Father     Heart Disease Brother     High Blood Pressure Brother     Colon Cancer Brother        Social History     Tobacco Use    Smoking status: Every Day     Packs/day: 1.00     Years: 30.00     Pack years: 30.00     Types: Cigarettes    Smokeless tobacco: Current     Types: Chew Vaping Use    Vaping Use: Never used   Substance Use Topics    Alcohol use: Yes     Comment: 6-8 beers per day    Drug use: No       Objective   /70 (Site: Left Upper Arm, Position: Standing, Cuff Size: Medium Adult)   Pulse 58   Ht 5' 8\" (1.727 m)   Wt 182 lb (82.6 kg)   SpO2 99%   BMI 27.67 kg/m²   Wt Readings from Last 3 Encounters:   02/01/23 182 lb (82.6 kg)   04/29/21 187 lb (84.8 kg)   07/23/20 187 lb (84.8 kg)     There were no vitals filed for this visit. Physical Exam  Vitals reviewed. Constitutional:       Appearance: Normal appearance. HENT:      Head: Normocephalic and atraumatic. Cardiovascular:      Rate and Rhythm: Normal rate and regular rhythm. Pulmonary:      Effort: Pulmonary effort is normal.      Breath sounds: Normal breath sounds. Neurological:      General: No focal deficit present. Mental Status: He is alert and oriented to person, place, and time. Psychiatric:         Behavior: Behavior normal.         Thought Content: Thought content normal.   Tenderness to palpation on deltoid insertion on lateral shoulder. ROM intact. Str intact. Assessment   Plan   1. Encounter for well adult exam without abnormal findings  2. Pain of right deltoid   Discussed home stretches and rest for deltoid strain. If not improving ro worsening then consider PT vs ortho evaluation. Declines screenings as above.      Personalized Preventive Plan   Current Health Maintenance Status  Immunization History   Administered Date(s) Administered    COVID-19, J&J, (age 18y+), IM, 0.5 mL 03/24/2021    Influenza, FLUARIX, FLULAVAL, FLUZONE (age 10 mo+) AND AFLURIA, (age 1 y+), PF, 0.5mL 12/17/2018    Pneumococcal Polysaccharide ([id number]) 12/17/2018    Tdap (Boostrix, Adacel) 12/17/2018        Health Maintenance   Topic Date Due    Colorectal Cancer Screen  Never done    Shingles vaccine (1 of 2) Never done    Low dose CT lung screening  Never done    COVID-19 Vaccine (2 - Booster for Nsesa series) 05/19/2021    Flu vaccine (1) 08/01/2022    A1C test (Diabetic or Prediabetic)  02/01/2024    Lipids  02/01/2024    Depression Screen  02/01/2024    DTaP/Tdap/Td vaccine (2 - Td or Tdap) 12/17/2028    Pneumococcal 0-64 years Vaccine  Completed    Hepatitis C screen  Completed    HIV screen  Completed    Hepatitis A vaccine  Aged Out    Hib vaccine  Aged Out    Meningococcal (ACWY) vaccine  Aged Out     Recommendations for [place] Due: see orders and patient instructions/AVS.    No follow-ups on file.

## 2023-02-02 LAB
ESTIMATED AVERAGE GLUCOSE: 122.6 MG/DL
HBA1C MFR BLD: 5.9 %

## 2023-05-15 ENCOUNTER — TELEPHONE (OUTPATIENT)
Dept: FAMILY MEDICINE CLINIC | Age: 63
End: 2023-05-15

## 2023-05-15 NOTE — TELEPHONE ENCOUNTER
Left voicemail for patient to call the office back. Patient is due for diabetic check up as well as Ct lung screening, please add that to appointment note.

## 2023-06-21 ENCOUNTER — OFFICE VISIT (OUTPATIENT)
Dept: FAMILY MEDICINE CLINIC | Age: 63
End: 2023-06-21
Payer: COMMERCIAL

## 2023-06-21 VITALS
HEART RATE: 74 BPM | HEIGHT: 68 IN | SYSTOLIC BLOOD PRESSURE: 140 MMHG | OXYGEN SATURATION: 99 % | BODY MASS INDEX: 27.49 KG/M2 | WEIGHT: 181.4 LBS | DIASTOLIC BLOOD PRESSURE: 68 MMHG

## 2023-06-21 DIAGNOSIS — R73.03 PREDIABETES: Primary | ICD-10-CM

## 2023-06-21 DIAGNOSIS — E78.2 MIXED HYPERLIPIDEMIA: ICD-10-CM

## 2023-06-21 DIAGNOSIS — I10 ESSENTIAL HYPERTENSION: ICD-10-CM

## 2023-06-21 LAB — HBA1C MFR BLD: 5.9 %

## 2023-06-21 PROCEDURE — 3017F COLORECTAL CA SCREEN DOC REV: CPT | Performed by: STUDENT IN AN ORGANIZED HEALTH CARE EDUCATION/TRAINING PROGRAM

## 2023-06-21 PROCEDURE — 3074F SYST BP LT 130 MM HG: CPT | Performed by: STUDENT IN AN ORGANIZED HEALTH CARE EDUCATION/TRAINING PROGRAM

## 2023-06-21 PROCEDURE — 4004F PT TOBACCO SCREEN RCVD TLK: CPT | Performed by: STUDENT IN AN ORGANIZED HEALTH CARE EDUCATION/TRAINING PROGRAM

## 2023-06-21 PROCEDURE — 99214 OFFICE O/P EST MOD 30 MIN: CPT | Performed by: STUDENT IN AN ORGANIZED HEALTH CARE EDUCATION/TRAINING PROGRAM

## 2023-06-21 PROCEDURE — G8419 CALC BMI OUT NRM PARAM NOF/U: HCPCS | Performed by: STUDENT IN AN ORGANIZED HEALTH CARE EDUCATION/TRAINING PROGRAM

## 2023-06-21 PROCEDURE — 3078F DIAST BP <80 MM HG: CPT | Performed by: STUDENT IN AN ORGANIZED HEALTH CARE EDUCATION/TRAINING PROGRAM

## 2023-06-21 PROCEDURE — 83037 HB GLYCOSYLATED A1C HOME DEV: CPT | Performed by: STUDENT IN AN ORGANIZED HEALTH CARE EDUCATION/TRAINING PROGRAM

## 2023-06-21 PROCEDURE — G8427 DOCREV CUR MEDS BY ELIG CLIN: HCPCS | Performed by: STUDENT IN AN ORGANIZED HEALTH CARE EDUCATION/TRAINING PROGRAM

## 2023-06-21 RX ORDER — LOSARTAN POTASSIUM 25 MG/1
25 TABLET ORAL DAILY
Qty: 90 TABLET | Refills: 1 | Status: CANCELLED | OUTPATIENT
Start: 2023-06-21

## 2023-06-21 RX ORDER — LOSARTAN POTASSIUM 50 MG/1
25 TABLET ORAL DAILY
Qty: 90 TABLET | Refills: 1 | Status: SHIPPED | OUTPATIENT
Start: 2023-06-21

## 2023-06-21 NOTE — PROGRESS NOTES
hypertension  Blood pressure is borderline in office today. Recommend monitoring at home and if continued elevation then would increase losartan to a full 50 mg.  - losartan (COZAAR) 50 MG tablet; Take 0.5 tablets by mouth daily  Dispense: 90 tablet; Refill: 1    3. Mixed hyperlipidemia  Due for recheck of cholesterol. Has been compliant with simvastatin  - Lipid Panel; Future    Declines cancer screenings and vaccinations    Return in about 6 months (around 12/21/2023).

## 2023-07-09 DIAGNOSIS — I10 ESSENTIAL HYPERTENSION: ICD-10-CM

## 2023-07-10 RX ORDER — AMLODIPINE BESYLATE 10 MG/1
TABLET ORAL
Qty: 90 TABLET | Refills: 1 | Status: SHIPPED | OUTPATIENT
Start: 2023-07-10

## 2023-07-10 NOTE — TELEPHONE ENCOUNTER
.Refill Request     CONFIRM preferred pharmacy with the patient. If Mail Order Rx - Pend for 90 day refill. Last Seen: Last Seen Department: 6/21/2023  Last Seen by PCP: 6/21/2023    Last Written: 1/9/23 90 with 1     If no future appointment scheduled:  Review the last OV with PCP and review information for follow-up visit,  Route STAFF MESSAGE with patient name to the [facility] for scheduling with the following information:            -  Timing of next visit           -  Visit type ie Physical, OV, etc           -  Diagnoses/Reason ie. COPD, HTN - Do not use MEDICATION, Follow-up or CHECK UP - Give reason for visit      Next Appointment:   No future appointments. Message sent to [address] [place] [address] to schedule appt with patient?   YES      Requested Prescriptions     Pending Prescriptions Disp Refills    amLODIPine (NORVASC) 10 MG tablet [Pharmacy Med Name: AMLODIPINE BESYLATE 10 MG TAB] 90 tablet 1     Sig: TAKE 1 TABLET BY MOUTH EVERY DAY

## 2023-08-29 DIAGNOSIS — I10 ESSENTIAL HYPERTENSION: ICD-10-CM

## 2023-08-29 RX ORDER — LOSARTAN POTASSIUM 50 MG/1
25 TABLET ORAL DAILY
Qty: 90 TABLET | Refills: 1 | Status: SHIPPED | OUTPATIENT
Start: 2023-08-29

## 2023-08-29 NOTE — TELEPHONE ENCOUNTER
patient STATES LAST VISIT YOU TOLD HIM TO TAKE 50MG. patient STATES HE DOES Not have enough. Refill Request     CONFIRM preferred pharmacy with the patient. If Mail Order Rx - Pend for 90 day refill. Last Seen: Last Seen Department: 6/21/2023  Last Seen by PCP: 6/21/2023    Last Written: 6/21/2023, #90, 1 REFILL    If no future appointment scheduled:  Review the last OV with PCP and review information for follow-up visit,  Route STAFF MESSAGE with patient name to the [facility] for scheduling with the following information:            -  Timing of next visit           -  Visit type ie Physical, OV, etc           -  Diagnoses/Reason ie. COPD, HTN - Do not use MEDICATION, Follow-up or CHECK UP - Give reason for visit      Next Appointment:   Future Appointments   Date Time Provider [address]   12/21/2023  3:15 PM DO KEISHA Montano [place] - DYD       Message sent to [facility] to schedule appt with patient?   N/A      Requested Prescriptions     Pending Prescriptions Disp Refills    losartan (COZAAR) 50 MG tablet 90 tablet 1     Sig: Take 0.5 tablets by mouth daily

## 2023-09-29 DIAGNOSIS — E78.2 MIXED HYPERLIPIDEMIA: ICD-10-CM

## 2023-09-30 NOTE — TELEPHONE ENCOUNTER
Refill Request     CONFIRM preferred pharmacy with the patient. If Mail Order Rx - Pend for 90 day refill. Last Seen: Last Seen Department: 6/21/2023  Last Seen by PCP: 6/21/2023    Last Written: 5/31/2022    If no future appointment scheduled:  Review the last OV with PCP and review information for follow-up visit,  Route STAFF MESSAGE with patient name to the [facility] for scheduling with the following information:            -  Timing of next visit           -  Visit type ie Physical, OV, etc           -  Diagnoses/Reason ie. COPD, HTN - Do not use MEDICATION, Follow-up or CHECK UP - Give reason for visit      Next Appointment:   Future Appointments   Date Time Provider [address]   12/21/2023  3:15 PM DO KEISHA Jim  [place] - DYRHONDA       Message sent to [address] [place] [address] to schedule appt with patient?   NO      Requested Prescriptions     Pending Prescriptions Disp Refills    simvastatin (ZOCOR) 20 MG tablet [Pharmacy Med Name: SIMVASTATIN 20 MG TABLET] 90 tablet 1     Sig: TAKE 1 TABLET BY MOUTH EVERY DAY IN THE EVENING

## 2023-10-02 RX ORDER — SIMVASTATIN 20 MG
TABLET ORAL
Qty: 90 TABLET | Refills: 1 | Status: SHIPPED | OUTPATIENT
Start: 2023-10-02

## 2024-01-05 DIAGNOSIS — I10 ESSENTIAL HYPERTENSION: ICD-10-CM

## 2024-01-05 RX ORDER — AMLODIPINE BESYLATE 10 MG/1
TABLET ORAL
Qty: 90 TABLET | Refills: 1 | Status: SHIPPED | OUTPATIENT
Start: 2024-01-05

## 2024-01-05 NOTE — TELEPHONE ENCOUNTER
Refill Request     CONFIRM preferred pharmacy with the patient.    If Mail Order Rx - Pend for 90 day refill.      Last Seen: Last Seen Department: 6/21/2023  Last Seen by PCP: 6/21/2023    Last Written: 07/10/23 90 with 1 refill    If no future appointment scheduled:  Review the last OV with PCP and review information for follow-up visit,  Route STAFF MESSAGE with patient name to the  Pool for scheduling with the following information:            -  Timing of next visit           -  Visit type ie Physical, OV, etc           -  Diagnoses/Reason ie. COPD, HTN - Do not use MEDICATION, Follow-up or CHECK UP - Give reason for visit      Next Appointment:   No future appointments.    Message sent to  to schedule appt with patient?  NO      Requested Prescriptions     Pending Prescriptions Disp Refills    amLODIPine (NORVASC) 10 MG tablet [Pharmacy Med Name: AMLODIPINE BESYLATE 10 MG TAB] 90 tablet 1     Sig: TAKE 1 TABLET BY MOUTH EVERY DAY

## 2024-07-06 DIAGNOSIS — I10 ESSENTIAL HYPERTENSION: ICD-10-CM

## 2024-07-06 DIAGNOSIS — E78.2 MIXED HYPERLIPIDEMIA: ICD-10-CM

## 2024-07-06 RX ORDER — SIMVASTATIN 20 MG
TABLET ORAL
Qty: 90 TABLET | Refills: 1 | Status: SHIPPED | OUTPATIENT
Start: 2024-07-06

## 2024-07-06 RX ORDER — AMLODIPINE BESYLATE 10 MG/1
TABLET ORAL
Qty: 90 TABLET | Refills: 1 | Status: SHIPPED | OUTPATIENT
Start: 2024-07-06

## 2024-07-06 NOTE — TELEPHONE ENCOUNTER
Refill Request     CONFIRM preferred pharmacy with the patient.    If Mail Order Rx - Pend for 90 day refill.      Last Seen: Last Seen Department: 6/21/2023  Last Seen by PCP: 6/21/2023    Last Written: 10/2/2023 Simvastatin  1/5/2024 Amlodipine     If no future appointment scheduled:  Review the last OV with PCP and review information for follow-up visit,  Route STAFF MESSAGE with patient name to the  Pool for scheduling with the following information:            -  Timing of next visit           -  Visit type ie Physical, OV, etc           -  Diagnoses/Reason ie. COPD, HTN - Do not use MEDICATION, Follow-up or CHECK UP - Give reason for visit      Next Appointment:   No future appointments.    Message sent to  to schedule appt with patient?  YES- Return in about 6 months (around 12/21/2023).       Requested Prescriptions     Pending Prescriptions Disp Refills    simvastatin (ZOCOR) 20 MG tablet [Pharmacy Med Name: SIMVASTATIN 20 MG TABLET] 90 tablet 1     Sig: TAKE 1 TABLET BY MOUTH EVERY DAY IN THE EVENING    amLODIPine (NORVASC) 10 MG tablet [Pharmacy Med Name: AMLODIPINE BESYLATE 10 MG TAB] 90 tablet 1     Sig: TAKE 1 TABLET BY MOUTH EVERY DAY

## 2024-09-04 DIAGNOSIS — I10 ESSENTIAL HYPERTENSION: ICD-10-CM

## 2024-09-04 NOTE — TELEPHONE ENCOUNTER
Refill Request     CONFIRM preferred pharmacy with the patient.    If Mail Order Rx - Pend for 90 day refill.      Last Seen: Last Seen Department: 6/21/2023  Last Seen by PCP: 6/21/2023    Last Written: 8/29/23    If no future appointment scheduled:  Review the last OV with PCP and review information for follow-up visit,  Route STAFF MESSAGE with patient name to the  Pool for scheduling with the following information:            -  Timing of next visit           -  Visit type ie Physical, OV, etc           -  Diagnoses/Reason ie. COPD, HTN - Do not use MEDICATION, Follow-up or CHECK UP - Give reason for visit      Next Appointment:   No future appointments.    Message sent to  to schedule appt with patient?  YES  Return in about 6 months (around 12/21/2023).     Requested Prescriptions     Pending Prescriptions Disp Refills    losartan (COZAAR) 50 MG tablet [Pharmacy Med Name: LOSARTAN POTASSIUM 50 MG TAB] 45 tablet 3     Sig: TAKE 1/2 TABLET BY MOUTH DAILY

## 2024-09-05 RX ORDER — LOSARTAN POTASSIUM 50 MG/1
25 TABLET ORAL DAILY
Qty: 45 TABLET | Refills: 3 | Status: SHIPPED | OUTPATIENT
Start: 2024-09-05

## 2024-12-08 SDOH — ECONOMIC STABILITY: FOOD INSECURITY: WITHIN THE PAST 12 MONTHS, THE FOOD YOU BOUGHT JUST DIDN'T LAST AND YOU DIDN'T HAVE MONEY TO GET MORE.: NEVER TRUE

## 2024-12-08 SDOH — ECONOMIC STABILITY: FOOD INSECURITY: WITHIN THE PAST 12 MONTHS, YOU WORRIED THAT YOUR FOOD WOULD RUN OUT BEFORE YOU GOT MONEY TO BUY MORE.: NEVER TRUE

## 2024-12-08 SDOH — ECONOMIC STABILITY: INCOME INSECURITY: HOW HARD IS IT FOR YOU TO PAY FOR THE VERY BASICS LIKE FOOD, HOUSING, MEDICAL CARE, AND HEATING?: NOT HARD AT ALL

## 2024-12-08 ASSESSMENT — PATIENT HEALTH QUESTIONNAIRE - PHQ9
SUM OF ALL RESPONSES TO PHQ QUESTIONS 1-9: 0
SUM OF ALL RESPONSES TO PHQ QUESTIONS 1-9: 0
1. LITTLE INTEREST OR PLEASURE IN DOING THINGS: NOT AT ALL
2. FEELING DOWN, DEPRESSED OR HOPELESS: NOT AT ALL
SUM OF ALL RESPONSES TO PHQ QUESTIONS 1-9: 0
SUM OF ALL RESPONSES TO PHQ9 QUESTIONS 1 & 2: 0
SUM OF ALL RESPONSES TO PHQ9 QUESTIONS 1 & 2: 0
1. LITTLE INTEREST OR PLEASURE IN DOING THINGS: NOT AT ALL
SUM OF ALL RESPONSES TO PHQ QUESTIONS 1-9: 0
2. FEELING DOWN, DEPRESSED OR HOPELESS: NOT AT ALL

## 2024-12-09 ENCOUNTER — OFFICE VISIT (OUTPATIENT)
Dept: FAMILY MEDICINE CLINIC | Age: 64
End: 2024-12-09
Payer: COMMERCIAL

## 2024-12-09 VITALS
HEIGHT: 68 IN | SYSTOLIC BLOOD PRESSURE: 130 MMHG | HEART RATE: 71 BPM | WEIGHT: 185 LBS | DIASTOLIC BLOOD PRESSURE: 72 MMHG | OXYGEN SATURATION: 97 % | BODY MASS INDEX: 28.04 KG/M2

## 2024-12-09 DIAGNOSIS — I10 ESSENTIAL HYPERTENSION: ICD-10-CM

## 2024-12-09 DIAGNOSIS — E78.2 MIXED HYPERLIPIDEMIA: ICD-10-CM

## 2024-12-09 DIAGNOSIS — R73.03 PREDIABETES: Primary | ICD-10-CM

## 2024-12-09 PROCEDURE — 99214 OFFICE O/P EST MOD 30 MIN: CPT | Performed by: STUDENT IN AN ORGANIZED HEALTH CARE EDUCATION/TRAINING PROGRAM

## 2024-12-09 PROCEDURE — 3017F COLORECTAL CA SCREEN DOC REV: CPT | Performed by: STUDENT IN AN ORGANIZED HEALTH CARE EDUCATION/TRAINING PROGRAM

## 2024-12-09 PROCEDURE — 1123F ACP DISCUSS/DSCN MKR DOCD: CPT | Performed by: STUDENT IN AN ORGANIZED HEALTH CARE EDUCATION/TRAINING PROGRAM

## 2024-12-09 PROCEDURE — 4004F PT TOBACCO SCREEN RCVD TLK: CPT | Performed by: STUDENT IN AN ORGANIZED HEALTH CARE EDUCATION/TRAINING PROGRAM

## 2024-12-09 PROCEDURE — G8419 CALC BMI OUT NRM PARAM NOF/U: HCPCS | Performed by: STUDENT IN AN ORGANIZED HEALTH CARE EDUCATION/TRAINING PROGRAM

## 2024-12-09 PROCEDURE — G8484 FLU IMMUNIZE NO ADMIN: HCPCS | Performed by: STUDENT IN AN ORGANIZED HEALTH CARE EDUCATION/TRAINING PROGRAM

## 2024-12-09 PROCEDURE — 3078F DIAST BP <80 MM HG: CPT | Performed by: STUDENT IN AN ORGANIZED HEALTH CARE EDUCATION/TRAINING PROGRAM

## 2024-12-09 PROCEDURE — G8427 DOCREV CUR MEDS BY ELIG CLIN: HCPCS | Performed by: STUDENT IN AN ORGANIZED HEALTH CARE EDUCATION/TRAINING PROGRAM

## 2024-12-09 PROCEDURE — 3075F SYST BP GE 130 - 139MM HG: CPT | Performed by: STUDENT IN AN ORGANIZED HEALTH CARE EDUCATION/TRAINING PROGRAM

## 2024-12-09 RX ORDER — LOSARTAN POTASSIUM 50 MG/1
50 TABLET ORAL DAILY
Qty: 90 TABLET | Refills: 1 | Status: SHIPPED | OUTPATIENT
Start: 2024-12-09

## 2024-12-09 RX ORDER — SIMVASTATIN 20 MG
TABLET ORAL
Qty: 90 TABLET | Refills: 1 | Status: SHIPPED | OUTPATIENT
Start: 2024-12-09

## 2024-12-09 RX ORDER — AMLODIPINE BESYLATE 10 MG/1
10 TABLET ORAL DAILY
Qty: 90 TABLET | Refills: 1 | Status: SHIPPED | OUTPATIENT
Start: 2024-12-09

## 2024-12-09 SDOH — ECONOMIC STABILITY: FOOD INSECURITY: WITHIN THE PAST 12 MONTHS, THE FOOD YOU BOUGHT JUST DIDN'T LAST AND YOU DIDN'T HAVE MONEY TO GET MORE.: NEVER TRUE

## 2024-12-09 SDOH — ECONOMIC STABILITY: FOOD INSECURITY: WITHIN THE PAST 12 MONTHS, YOU WORRIED THAT YOUR FOOD WOULD RUN OUT BEFORE YOU GOT MONEY TO BUY MORE.: NEVER TRUE

## 2024-12-09 SDOH — ECONOMIC STABILITY: INCOME INSECURITY: HOW HARD IS IT FOR YOU TO PAY FOR THE VERY BASICS LIKE FOOD, HOUSING, MEDICAL CARE, AND HEATING?: NOT HARD AT ALL

## 2024-12-09 NOTE — PROGRESS NOTES
Assessment:  Encounter Diagnoses   Name Primary?    Essential hypertension     Mixed hyperlipidemia     Prediabetes Yes       Plan:  1. Prediabetes  -     Hemoglobin A1C; Future  2. Essential hypertension  -     amLODIPine (NORVASC) 10 MG tablet; Take 1 tablet by mouth daily, Disp-90 tablet, R-1Normal  -     losartan (COZAAR) 50 MG tablet; Take 1 tablet by mouth daily, Disp-90 tablet, R-1Normal  -     Comprehensive Metabolic Panel; Future  3. Mixed hyperlipidemia  -     simvastatin (ZOCOR) 20 MG tablet; TAKE 1 TABLET BY MOUTH EVERY DAY IN THE EVENING, Disp-90 tablet, R-1Normal  -     Lipid, Fasting; Future  Will increase losartan to 50mg for improved BP control. Monitor response at home. Recheck labs as above.        Return in about 1 year (around 12/9/2025) for annual physical.      Patient: Po Post is a 64 y.o. male who presents today with the following Chief Complaint(s):  Chief Complaint   Patient presents with    Medication Check         HPI    HTN  Norvasc 10  Cozaar 50     HLD  Has been consistently taking his simvastatin 20 mg daily     Smoking   1 pack every 3 days  Working to decrease  Has had side effects with wellbutrin     Low dose Ct - declines  Colon cancer screening - declines  Shingles - declines at this time.    Current Outpatient Medications   Medication Sig Dispense Refill    amLODIPine (NORVASC) 10 MG tablet Take 1 tablet by mouth daily 90 tablet 1    losartan (COZAAR) 50 MG tablet Take 1 tablet by mouth daily 90 tablet 1    simvastatin (ZOCOR) 20 MG tablet TAKE 1 TABLET BY MOUTH EVERY DAY IN THE EVENING 90 tablet 1     No current facility-administered medications for this visit.       Patient's past medical history, surgical history, family history, medications,  andallergies  were all reviewed and updated as appropriate today.      Review of Systems  All other systems reviewed and negative    Physical Exam  Vitals reviewed.   Constitutional:       Appearance: Normal appearance.   HENT:

## 2024-12-20 DIAGNOSIS — E78.2 MIXED HYPERLIPIDEMIA: ICD-10-CM

## 2024-12-20 DIAGNOSIS — R73.03 PREDIABETES: ICD-10-CM

## 2024-12-20 DIAGNOSIS — I10 ESSENTIAL HYPERTENSION: ICD-10-CM

## 2024-12-21 LAB
ALBUMIN SERPL-MCNC: 4.7 G/DL (ref 3.4–5)
ALBUMIN/GLOB SERPL: 2 {RATIO} (ref 1.1–2.2)
ALP SERPL-CCNC: 124 U/L (ref 40–129)
ALT SERPL-CCNC: 39 U/L (ref 10–40)
ANION GAP SERPL CALCULATED.3IONS-SCNC: 14 MMOL/L (ref 3–16)
AST SERPL-CCNC: 23 U/L (ref 15–37)
BILIRUB SERPL-MCNC: 0.3 MG/DL (ref 0–1)
BUN SERPL-MCNC: 10 MG/DL (ref 7–20)
CALCIUM SERPL-MCNC: 9.5 MG/DL (ref 8.3–10.6)
CHLORIDE SERPL-SCNC: 99 MMOL/L (ref 99–110)
CHOLEST SERPL-MCNC: 159 MG/DL (ref 0–199)
CO2 SERPL-SCNC: 25 MMOL/L (ref 21–32)
CREAT SERPL-MCNC: 0.8 MG/DL (ref 0.8–1.3)
EST. AVERAGE GLUCOSE BLD GHB EST-MCNC: 122.6 MG/DL
GFR SERPLBLD CREATININE-BSD FMLA CKD-EPI: >90 ML/MIN/{1.73_M2}
GLUCOSE SERPL-MCNC: 117 MG/DL (ref 70–99)
HBA1C MFR BLD: 5.9 %
HDLC SERPL-MCNC: 58 MG/DL (ref 40–60)
LDL CHOLESTEROL: 68 MG/DL
POTASSIUM SERPL-SCNC: 4.1 MMOL/L (ref 3.5–5.1)
PROT SERPL-MCNC: 7 G/DL (ref 6.4–8.2)
SODIUM SERPL-SCNC: 138 MMOL/L (ref 136–145)
TRIGL SERPL-MCNC: 163 MG/DL (ref 0–150)
VLDLC SERPL CALC-MCNC: 33 MG/DL

## 2025-03-10 ENCOUNTER — TELEPHONE (OUTPATIENT)
Dept: FAMILY MEDICINE CLINIC | Age: 65
End: 2025-03-10

## 2025-07-04 DIAGNOSIS — I10 ESSENTIAL HYPERTENSION: ICD-10-CM

## 2025-07-04 DIAGNOSIS — E78.2 MIXED HYPERLIPIDEMIA: ICD-10-CM

## 2025-07-07 RX ORDER — SIMVASTATIN 20 MG
20 TABLET ORAL EVERY EVENING
Qty: 90 TABLET | Refills: 1 | Status: SHIPPED | OUTPATIENT
Start: 2025-07-07

## 2025-07-07 RX ORDER — AMLODIPINE BESYLATE 10 MG/1
10 TABLET ORAL DAILY
Qty: 90 TABLET | Refills: 1 | Status: SHIPPED | OUTPATIENT
Start: 2025-07-07

## 2025-07-07 NOTE — TELEPHONE ENCOUNTER
Refill Request     CONFIRM preferred pharmacy with the patient.    If Mail Order Rx - Pend for 90 day refill.      Last Seen: Last Seen Department: 12/9/2024  Last Seen by PCP: 12/9/2024    Last Written: amlodipine 12/9/24 90 with 1 refill     Simvastatin 12/9/24 90 with 1 refill     If no future appointment scheduled:  Review the last OV with PCP and review information for follow-up visit,  Route STAFF MESSAGE with patient name to the  Pool for scheduling with the following information:            -  Timing of next visit           -  Visit type ie Physical, OV, etc           -  Diagnoses/Reason ie. COPD, HTN - Do not use MEDICATION, Follow-up or CHECK UP - Give reason for visit      Next Appointment:   No future appointments.    Message sent to  to schedule appt with patient?  NO      Requested Prescriptions     Pending Prescriptions Disp Refills    simvastatin (ZOCOR) 20 MG tablet [Pharmacy Med Name: SIMVASTATIN 20 MG TABLET] 90 tablet 1     Sig: TAKE 1 TABLET BY MOUTH EVERY DAY IN THE EVENING    amLODIPine (NORVASC) 10 MG tablet [Pharmacy Med Name: AMLODIPINE BESYLATE 10 MG TAB] 90 tablet 1     Sig: TAKE 1 TABLET BY MOUTH EVERY DAY

## 2025-08-09 DIAGNOSIS — I10 ESSENTIAL HYPERTENSION: ICD-10-CM

## 2025-08-11 RX ORDER — LOSARTAN POTASSIUM 50 MG/1
50 TABLET ORAL DAILY
Qty: 90 TABLET | Refills: 1 | Status: SHIPPED | OUTPATIENT
Start: 2025-08-11